# Patient Record
Sex: MALE | Race: WHITE | NOT HISPANIC OR LATINO | Employment: FULL TIME | URBAN - METROPOLITAN AREA
[De-identification: names, ages, dates, MRNs, and addresses within clinical notes are randomized per-mention and may not be internally consistent; named-entity substitution may affect disease eponyms.]

---

## 2018-10-24 ENCOUNTER — HOSPITAL ENCOUNTER (OUTPATIENT)
Dept: FAMILY MEDICINE CLINIC | Facility: CLINIC | Age: 48
Setting detail: SPECIMEN
Discharge: HOME | End: 2018-10-24
Attending: FAMILY MEDICINE | Admitting: FAMILY MEDICINE

## 2018-10-24 LAB
ALBUMIN SERPL-MCNC: 3.8 G/DL (ref 3.5–4.8)
ALBUMIN/GLOB SERPL: 1.1 {RATIO} (ref 1–1.7)
ALP SERPL-CCNC: 85 IU/L (ref 32–91)
ALT SERPL-CCNC: 35 IU/L (ref 17–63)
ANION GAP SERPL CALC-SCNC: 16.1 MMOL/L (ref 10–20)
AST SERPL-CCNC: 43 IU/L (ref 15–41)
BILIRUB SERPL-MCNC: 0.8 MG/DL (ref 0.3–1.2)
BUN SERPL-MCNC: 16 MG/DL (ref 8–20)
BUN/CREAT SERPL: 14.5 (ref 6.2–20.3)
CALCIUM SERPL-MCNC: 9.1 MG/DL (ref 8.9–10.3)
CHLORIDE SERPL-SCNC: 103 MMOL/L (ref 101–111)
CHOLEST SERPL-MCNC: 153 MG/DL
CHOLEST/HDLC SERPL: 4.6 {RATIO}
CONV CO2: 28 MMOL/L (ref 22–32)
CONV LDL CHOLESTEROL DIRECT: 116 MG/DL (ref 0–100)
CONV TOTAL PROTEIN: 7.4 G/DL (ref 6.1–7.9)
CREAT UR-MCNC: 1.1 MG/DL (ref 0.7–1.2)
GLOBULIN UR ELPH-MCNC: 3.6 G/DL (ref 2.5–3.8)
GLUCOSE SERPL-MCNC: 86 MG/DL (ref 65–99)
HDLC SERPL-MCNC: 34 MG/DL
LDLC/HDLC SERPL: 3.5 {RATIO}
LIPID INTERPRETATION: ABNORMAL
POTASSIUM SERPL-SCNC: 4.1 MMOL/L (ref 3.6–5.1)
SODIUM SERPL-SCNC: 143 MMOL/L (ref 136–144)
TRIGL SERPL-MCNC: 108 MG/DL
URATE SERPL-MCNC: 8.5 MG/DL (ref 4.8–8.7)
VLDLC SERPL CALC-MCNC: 3.9 MG/DL

## 2019-07-02 PROBLEM — I10 HYPERTENSION: Status: ACTIVE | Noted: 2018-10-24

## 2019-07-02 PROBLEM — M79.89 SWELLING OF LOWER EXTREMITY: Status: ACTIVE | Noted: 2019-05-07

## 2019-07-02 PROBLEM — E66.9 OBESITY: Status: ACTIVE | Noted: 2018-10-24

## 2019-07-02 PROBLEM — M10.9 GOUT: Status: ACTIVE | Noted: 2018-10-24

## 2019-07-02 RX ORDER — NAPROXEN SODIUM 220 MG
1 TABLET ORAL DAILY
COMMUNITY
Start: 2018-10-24 | End: 2020-05-20

## 2019-07-02 RX ORDER — SPIRONOLACTONE AND HYDROCHLOROTHIAZIDE 25; 25 MG/1; MG/1
1 TABLET ORAL EVERY MORNING
COMMUNITY
Start: 2018-11-20 | End: 2019-07-30 | Stop reason: SDUPTHER

## 2019-07-02 RX ORDER — NEBIVOLOL 10 MG/1
1 TABLET ORAL DAILY
COMMUNITY
Start: 2019-05-07 | End: 2019-07-02 | Stop reason: ALTCHOICE

## 2019-07-02 RX ORDER — SPIRONOLACTONE AND HYDROCHLOROTHIAZIDE 25; 25 MG/1; MG/1
TABLET ORAL
Qty: 30 TABLET | Refills: 0 | Status: SHIPPED | OUTPATIENT
Start: 2019-07-02 | End: 2019-07-30 | Stop reason: SDUPTHER

## 2019-07-02 RX ORDER — CARVEDILOL 6.25 MG/1
6.25 TABLET ORAL 2 TIMES DAILY WITH MEALS
COMMUNITY
End: 2019-08-08 | Stop reason: SDUPTHER

## 2019-07-02 RX ORDER — LOSARTAN POTASSIUM 100 MG/1
1 TABLET ORAL DAILY
COMMUNITY
Start: 2019-06-11 | End: 2019-07-09 | Stop reason: SDUPTHER

## 2019-07-02 NOTE — TELEPHONE ENCOUNTER
Bystolic was changed to Bystolic 6.25mg BID 60/0 and was called in to the [facility] on [facility]. Change was approved by  and the patient was notified.

## 2019-07-10 RX ORDER — LOSARTAN POTASSIUM 100 MG/1
TABLET ORAL
Qty: 90 TABLET | Refills: 0 | Status: SHIPPED | OUTPATIENT
Start: 2019-07-10 | End: 2019-10-05 | Stop reason: SDUPTHER

## 2019-07-30 RX ORDER — SPIRONOLACTONE AND HYDROCHLOROTHIAZIDE 25; 25 MG/1; MG/1
TABLET ORAL
Qty: 30 TABLET | Refills: 0 | Status: SHIPPED | OUTPATIENT
Start: 2019-07-30 | End: 2019-09-02 | Stop reason: SDUPTHER

## 2019-07-30 NOTE — TELEPHONE ENCOUNTER
Last visit:  6/11/19  Next visit: none  Last labs: 10/24/18    Rx requested:Spironalactone    Ph[facility]acy: [facility]

## 2019-08-06 RX ORDER — CARVEDILOL 6.25 MG/1
TABLET ORAL
Qty: 180 TABLET | Refills: 0 | Status: CANCELLED | OUTPATIENT
Start: 2019-08-06 | End: 2019-11-04

## 2019-08-07 ENCOUNTER — TELEPHONE (OUTPATIENT)
Dept: FAMILY MEDICINE CLINIC | Facility: CLINIC | Age: 49
End: 2019-08-07

## 2019-08-07 NOTE — TELEPHONE ENCOUNTER
Last visit:  6/11/19  Next visit: none  Last labs: 10/24/18    Rx requested: Carvedilol #60  Pharmacy: [facility] on [address]     (Patients wife called and said he will be out of this med tomorrow.)

## 2019-08-08 RX ORDER — CARVEDILOL 6.25 MG/1
6.25 TABLET ORAL 2 TIMES DAILY WITH MEALS
Qty: 60 TABLET | Refills: 0 | Status: SHIPPED | OUTPATIENT
Start: 2019-08-08 | End: 2019-10-15 | Stop reason: SDUPTHER

## 2019-08-08 NOTE — TELEPHONE ENCOUNTER
I called and spoke with his wife Berta and she said it has been running around 120/80 everyday. I went ahead and called in the #180 of the carvedilol to [facility] [address] since he was out today. Patient made and appt in October to see you and said he just wants to do labs that day and didn't want to have them done prior.

## 2019-08-08 NOTE — TELEPHONE ENCOUNTER
See  Other note. I already called in a 90 day supply since his BP has been running around 120/80s.

## 2019-09-03 RX ORDER — SPIRONOLACTONE AND HYDROCHLOROTHIAZIDE 25; 25 MG/1; MG/1
1 TABLET ORAL EVERY MORNING
Qty: 90 TABLET | Refills: 0 | Status: SHIPPED | OUTPATIENT
Start: 2019-09-03 | End: 2019-10-15 | Stop reason: SDUPTHER

## 2019-09-03 RX ORDER — SPIRONOLACTONE AND HYDROCHLOROTHIAZIDE 25; 25 MG/1; MG/1
TABLET ORAL
Qty: 30 TABLET | Refills: 1 | Status: SHIPPED | OUTPATIENT
Start: 2019-09-03 | End: 2019-09-03 | Stop reason: SDUPTHER

## 2019-09-03 RX ORDER — SPIRONOLACTONE AND HYDROCHLOROTHIAZIDE 25; 25 MG/1; MG/1
1 TABLET ORAL EVERY MORNING
Qty: 90 TABLET | Refills: 1 | Status: CANCELLED | OUTPATIENT
Start: 2019-09-03

## 2019-09-03 NOTE — TELEPHONE ENCOUNTER
Last visit:  6/11/19  Next visit: 10/15/19  Last labs: 10/24/18    Rx requested: Spironolactone   [facility]

## 2019-10-08 RX ORDER — LOSARTAN POTASSIUM 100 MG/1
TABLET ORAL
Qty: 30 TABLET | Refills: 0 | Status: SHIPPED | OUTPATIENT
Start: 2019-10-08 | End: 2019-10-15 | Stop reason: SDUPTHER

## 2019-10-08 NOTE — TELEPHONE ENCOUNTER
Last visit:  6/11/19  Next visit: 10/15/19  Last labs: 10/24/18    Rx requested: Losartan  [facility]     (patient is out of the med)

## 2019-10-08 NOTE — TELEPHONE ENCOUNTER
Last visit:  6/11/19  Next visit: 10/15/19  Last labs: 10/24/18    Rx requested: Losartan   [facility]: [facility] [place]

## 2019-10-09 RX ORDER — LOSARTAN POTASSIUM 100 MG/1
TABLET ORAL
Qty: 90 TABLET | Refills: 0 | OUTPATIENT
Start: 2019-10-09

## 2019-10-15 ENCOUNTER — OFFICE VISIT (OUTPATIENT)
Dept: FAMILY MEDICINE CLINIC | Facility: CLINIC | Age: 49
End: 2019-10-15

## 2019-10-15 VITALS
BODY MASS INDEX: 40.43 KG/M2 | TEMPERATURE: 98.2 F | RESPIRATION RATE: 16 BRPM | OXYGEN SATURATION: 100 % | HEART RATE: 82 BPM | SYSTOLIC BLOOD PRESSURE: 145 MMHG | DIASTOLIC BLOOD PRESSURE: 100 MMHG | HEIGHT: 74 IN | WEIGHT: 315 LBS

## 2019-10-15 DIAGNOSIS — I10 ESSENTIAL HYPERTENSION: Primary | ICD-10-CM

## 2019-10-15 DIAGNOSIS — E66.01 CLASS 3 SEVERE OBESITY DUE TO EXCESS CALORIES WITH SERIOUS COMORBIDITY AND BODY MASS INDEX (BMI) OF 40.0 TO 44.9 IN ADULT (HCC): ICD-10-CM

## 2019-10-15 PROCEDURE — 99213 OFFICE O/P EST LOW 20 MIN: CPT | Performed by: FAMILY MEDICINE

## 2019-10-15 RX ORDER — LOSARTAN POTASSIUM 100 MG/1
100 TABLET ORAL DAILY
Qty: 90 TABLET | Refills: 0 | Status: SHIPPED | OUTPATIENT
Start: 2019-10-15 | End: 2020-01-30

## 2019-10-15 RX ORDER — SPIRONOLACTONE AND HYDROCHLOROTHIAZIDE 25; 25 MG/1; MG/1
1 TABLET ORAL EVERY MORNING
Qty: 90 TABLET | Refills: 0 | Status: SHIPPED | OUTPATIENT
Start: 2019-10-15 | End: 2019-11-16

## 2019-10-15 RX ORDER — CARVEDILOL 6.25 MG/1
6.25 TABLET ORAL 2 TIMES DAILY WITH MEALS
Qty: 180 TABLET | Refills: 0 | Status: SHIPPED | OUTPATIENT
Start: 2019-10-15 | End: 2019-11-16

## 2019-11-12 ENCOUNTER — CLINICAL SUPPORT (OUTPATIENT)
Dept: FAMILY MEDICINE CLINIC | Facility: CLINIC | Age: 49
End: 2019-11-12

## 2019-11-12 DIAGNOSIS — I10 ESSENTIAL HYPERTENSION: ICD-10-CM

## 2019-11-12 PROCEDURE — 80053 COMPREHEN METABOLIC PANEL: CPT | Performed by: FAMILY MEDICINE

## 2019-11-12 PROCEDURE — 36415 COLL VENOUS BLD VENIPUNCTURE: CPT | Performed by: FAMILY MEDICINE

## 2019-11-12 PROCEDURE — 80061 LIPID PANEL: CPT | Performed by: FAMILY MEDICINE

## 2019-11-13 LAB
ALBUMIN SERPL-MCNC: 4 G/DL (ref 3.5–5.2)
ALBUMIN/GLOB SERPL: 1 G/DL
ALP SERPL-CCNC: 89 U/L (ref 39–117)
ALT SERPL W P-5'-P-CCNC: 22 U/L (ref 1–41)
ANION GAP SERPL CALCULATED.3IONS-SCNC: 10.4 MMOL/L (ref 5–15)
AST SERPL-CCNC: 17 U/L (ref 1–40)
BILIRUB SERPL-MCNC: 0.4 MG/DL (ref 0.2–1.2)
BUN BLD-MCNC: 18 MG/DL (ref 6–20)
BUN/CREAT SERPL: 12.3 (ref 7–25)
CALCIUM SPEC-SCNC: 9.3 MG/DL (ref 8.6–10.5)
CHLORIDE SERPL-SCNC: 103 MMOL/L (ref 98–107)
CHOLEST SERPL-MCNC: 162 MG/DL (ref 0–200)
CO2 SERPL-SCNC: 30.6 MMOL/L (ref 22–29)
CREAT BLD-MCNC: 1.46 MG/DL (ref 0.76–1.27)
GFR SERPL CREATININE-BSD FRML MDRD: 51 ML/MIN/1.73
GLOBULIN UR ELPH-MCNC: 3.9 GM/DL
GLUCOSE BLD-MCNC: 109 MG/DL (ref 65–99)
HDLC SERPL-MCNC: 35 MG/DL (ref 40–60)
LDLC SERPL CALC-MCNC: 93 MG/DL (ref 0–100)
LDLC/HDLC SERPL: 2.66 {RATIO}
POTASSIUM BLD-SCNC: 4.6 MMOL/L (ref 3.5–5.2)
PROT SERPL-MCNC: 7.9 G/DL (ref 6–8.5)
SODIUM BLD-SCNC: 144 MMOL/L (ref 136–145)
TRIGL SERPL-MCNC: 170 MG/DL (ref 0–150)
VLDLC SERPL-MCNC: 34 MG/DL (ref 5–40)

## 2019-11-16 RX ORDER — CARVEDILOL 12.5 MG/1
12.5 TABLET ORAL 2 TIMES DAILY WITH MEALS
Status: SHIPPED
Start: 2019-11-16 | End: 2019-11-25 | Stop reason: SDUPTHER

## 2019-11-25 RX ORDER — CARVEDILOL 12.5 MG/1
12.5 TABLET ORAL 2 TIMES DAILY WITH MEALS
Qty: 60 TABLET | Refills: 0 | Status: SHIPPED | OUTPATIENT
Start: 2019-11-25 | End: 2019-12-23

## 2019-11-25 RX ORDER — CARVEDILOL 12.5 MG/1
TABLET ORAL
Qty: 180 TABLET | Refills: 0 | OUTPATIENT
Start: 2019-11-25

## 2019-11-25 RX ORDER — COLCHICINE 0.6 MG/1
0.6 TABLET ORAL
COMMUNITY
Start: 2018-10-24 | End: 2019-11-25

## 2019-11-25 RX ORDER — COLCHICINE 0.6 MG/1
TABLET ORAL
Qty: 20 TABLET | Refills: 0 | Status: SHIPPED | OUTPATIENT
Start: 2019-11-25 | End: 2020-02-14 | Stop reason: SDUPTHER

## 2019-11-25 NOTE — TELEPHONE ENCOUNTER
Pts wife states the new increased Coreg was not received at [facility].  Also feels he has gout and they are going to [place] this week and would like to get the Colcrys medication for this.  Last seen 10/15/19

## 2019-12-23 RX ORDER — CARVEDILOL 12.5 MG/1
TABLET ORAL
Qty: 60 TABLET | Refills: 0 | Status: SHIPPED | OUTPATIENT
Start: 2019-12-23 | End: 2019-12-26

## 2019-12-26 ENCOUNTER — TELEPHONE (OUTPATIENT)
Dept: FAMILY MEDICINE CLINIC | Facility: CLINIC | Age: 49
End: 2019-12-26

## 2019-12-26 RX ORDER — HYDRALAZINE HYDROCHLORIDE 10 MG/1
10 TABLET, FILM COATED ORAL 2 TIMES DAILY
Qty: 60 TABLET | Refills: 1 | Status: SHIPPED | OUTPATIENT
Start: 2019-12-26 | End: 2020-01-23 | Stop reason: SDUPTHER

## 2019-12-26 RX ORDER — CARVEDILOL 12.5 MG/1
12.5 TABLET ORAL 2 TIMES DAILY WITH MEALS
Qty: 60 TABLET | Refills: 0 | COMMUNITY
Start: 2019-12-26 | End: 2020-01-22

## 2019-12-26 NOTE — TELEPHONE ENCOUNTER
Let pt know since had issues w/ norvasc and kidneys not great, will have to go w/ bid hydralazine and see if that works

## 2019-12-27 RX ORDER — HYDRALAZINE HYDROCHLORIDE 10 MG/1
TABLET, FILM COATED ORAL
Qty: 180 TABLET | OUTPATIENT
Start: 2019-12-27

## 2020-01-22 RX ORDER — CARVEDILOL 12.5 MG/1
TABLET ORAL
Qty: 180 TABLET | Refills: 0 | Status: SHIPPED | OUTPATIENT
Start: 2020-01-22 | End: 2020-03-26 | Stop reason: SDUPTHER

## 2020-01-23 RX ORDER — HYDRALAZINE HYDROCHLORIDE 10 MG/1
10 TABLET, FILM COATED ORAL 2 TIMES DAILY
Qty: 60 TABLET | Refills: 1 | Status: SHIPPED | OUTPATIENT
Start: 2020-01-23 | End: 2020-02-21

## 2020-01-23 RX ORDER — HYDRALAZINE HYDROCHLORIDE 10 MG/1
TABLET, FILM COATED ORAL
Qty: 180 TABLET | OUTPATIENT
Start: 2020-01-23

## 2020-01-24 RX ORDER — HYDRALAZINE HYDROCHLORIDE 10 MG/1
10 TABLET, FILM COATED ORAL 2 TIMES DAILY
Qty: 60 TABLET | Refills: 1 | OUTPATIENT
Start: 2020-01-24

## 2020-01-24 RX ORDER — HYDRALAZINE HYDROCHLORIDE 10 MG/1
TABLET, FILM COATED ORAL
Qty: 180 TABLET | OUTPATIENT
Start: 2020-01-24

## 2020-01-24 NOTE — TELEPHONE ENCOUNTER
Last visit:  10/15/19  Next visit: none   Last labs: 11/12/19    Rx requested: Hydralazine  [facility]

## 2020-01-30 RX ORDER — LOSARTAN POTASSIUM 100 MG/1
TABLET ORAL
Qty: 90 TABLET | Refills: 0 | Status: SHIPPED | OUTPATIENT
Start: 2020-01-30 | End: 2020-03-26 | Stop reason: SDUPTHER

## 2020-02-14 RX ORDER — COLCHICINE 0.6 MG/1
TABLET ORAL
Qty: 20 TABLET | Refills: 0 | OUTPATIENT
Start: 2020-02-14

## 2020-02-14 RX ORDER — COLCHICINE 0.6 MG/1
TABLET ORAL
Qty: 20 TABLET | Refills: 0 | Status: SHIPPED | OUTPATIENT
Start: 2020-02-14 | End: 2020-07-14

## 2020-02-21 RX ORDER — HYDRALAZINE HYDROCHLORIDE 10 MG/1
TABLET, FILM COATED ORAL
Qty: 60 TABLET | Refills: 0 | Status: SHIPPED | OUTPATIENT
Start: 2020-02-21 | End: 2020-03-26

## 2020-02-21 NOTE — TELEPHONE ENCOUNTER
Last visit:  10/15/19  Next visit: none  Last labs: 11/12/19     Rx requested: hydrALAZINE 10 MG tablet  [facility]

## 2020-03-23 NOTE — TELEPHONE ENCOUNTER
Last visit:  10/15/19  Next visit: none   Last labs: 11/12/19    Rx requested: Hydralazine   [facility]

## 2020-03-25 RX ORDER — HYDRALAZINE HYDROCHLORIDE 10 MG/1
TABLET, FILM COATED ORAL
Qty: 60 TABLET | Refills: 0 | OUTPATIENT
Start: 2020-03-25

## 2020-03-26 RX ORDER — CARVEDILOL 12.5 MG/1
12.5 TABLET ORAL 2 TIMES DAILY WITH MEALS
Qty: 180 TABLET | Refills: 0 | Status: SHIPPED | OUTPATIENT
Start: 2020-03-26 | End: 2020-05-20

## 2020-03-26 RX ORDER — LOSARTAN POTASSIUM 100 MG/1
100 TABLET ORAL DAILY
Qty: 90 TABLET | Refills: 0 | Status: SHIPPED | OUTPATIENT
Start: 2020-03-26 | End: 2020-05-20

## 2020-03-26 RX ORDER — HYDRALAZINE HYDROCHLORIDE 25 MG/1
25 TABLET, FILM COATED ORAL 2 TIMES DAILY
Qty: 60 TABLET | Refills: 2 | Status: SHIPPED | OUTPATIENT
Start: 2020-03-26 | End: 2020-05-20

## 2020-03-26 RX ORDER — HYDRALAZINE HYDROCHLORIDE 25 MG/1
TABLET, FILM COATED ORAL
Qty: 180 TABLET | OUTPATIENT
Start: 2020-03-26

## 2020-05-20 ENCOUNTER — TELEPHONE (OUTPATIENT)
Dept: FAMILY MEDICINE CLINIC | Facility: CLINIC | Age: 50
End: 2020-05-20

## 2020-05-20 RX ORDER — NEBIVOLOL 10 MG/1
10 TABLET ORAL DAILY
Qty: 30 TABLET | Refills: 0 | Status: SHIPPED | OUTPATIENT
Start: 2020-05-20 | End: 2020-06-19

## 2020-05-22 ENCOUNTER — TELEPHONE (OUTPATIENT)
Dept: FAMILY MEDICINE CLINIC | Facility: CLINIC | Age: 50
End: 2020-05-22

## 2020-05-22 NOTE — TELEPHONE ENCOUNTER
I called the patients pharmacy and it is only going to cost $37.71. I sent the patient another my chart message to let him know that the pharmacy will be getting the medication ready for him.

## 2020-05-22 NOTE — TELEPHONE ENCOUNTER
----- Message from Azul Beckett DO sent at 5/21/2020  5:00 PM EDT -----  Regarding: FW: Prescription Question  Contact: [phone]  Did you get this info for a PA???  Ill put some samples up front     ----- Message -----  From: Melanie Tapia RT  Sent: 5/21/2020  10:04 AM EDT  To: Azul Beckett DO  Subject: FW: Prescription Question                            ----- Message -----  From: Boyd Reese Jr.  Sent: 5/21/2020   9:36 AM EDT  To: k [facility]  Subject: RE: Prescription Question                        [facility] faxed over a form for you to fill out and [employer] will need to be contacted to get prior authorization and they may need to know why I need to take Bystolic, even though [employer] will most likely not pay on it, to get the Bystolic savings card price, this process needs to happen and it needs to run through insurance first.  I have a 1 week supply of sample you gave me from when I took it before, hopefully we can get the refill going soon.  I am willing to pay full price if I have to, but if I can get it for $35 that would be a win.  Thank you for your help.  ----- Message -----  From: Azul Beckett DO  Sent: 5/20/2020  7:08 PM EDT  To: Boyd Reese Jr.  Subject: RE: Prescription Question  If your BP was controlled on JUST Bystolic, stop all others and we will see if good enough by itself    ----- Message -----     From: Boyd Reese Jr.     Sent: 5/20/2020  3:15 PM EDT       To: Azul Beckett DO  Subject: RE: Prescription Question    Absolutely, will send readings.  And, for the record, stop taking the others?  Thanks.  ----- Message -----  From: Azul Beckett DO  Sent: 5/20/2020  1:27 PM EDT  To: Boyd Reese Jr.  Subject: RE: Prescription Question  I sent out bystolic 10mg------------can check for online coupon...... I will need BP readings before the end of the month    ----- Message -----     From: Boyd Reese Jr.     Sent: 5/20/2020  1:02 PM EDT       To: Azul  Sujit, DO  Subject: Prescription Question    My bp has still been running high even after upping the hydralazine 25 MG tablet.  The one I was on before, instead of that, the Spironolactone 25mg w/hctz 25mg kept my bp down.  Is it not possible to go back to that one?  Or, even better, you had first put me on just 1 pill, Bystolic 10mg.  That one worked perfectly, the only reason we stopped was cost.  I am willing to go back on that and pay the cost, about $150 per month if that means only having to take 1 pill a day, that would be awesome.  Please advise.  Mitch Reese

## 2020-05-22 NOTE — TELEPHONE ENCOUNTER
I sent to patient a my chart message. Letting him know that the PA was approved today and approval was faxed to the pharmacy and that he can  more sample on Tuesday as we are closed to patients today-Monday.

## 2020-06-19 RX ORDER — CARVEDILOL 12.5 MG/1
TABLET ORAL
Qty: 180 TABLET | Refills: 0 | OUTPATIENT
Start: 2020-06-19

## 2020-06-19 RX ORDER — NEBIVOLOL HYDROCHLORIDE 10 MG/1
TABLET ORAL
Qty: 90 TABLET | Refills: 0 | Status: SHIPPED | OUTPATIENT
Start: 2020-06-19 | End: 2020-09-18

## 2020-06-19 RX ORDER — LOSARTAN POTASSIUM 100 MG/1
100 TABLET ORAL DAILY
Qty: 90 TABLET | Refills: 0 | OUTPATIENT
Start: 2020-06-19

## 2020-07-14 RX ORDER — COLCHICINE 0.6 MG/1
TABLET ORAL
Qty: 20 TABLET | Refills: 0 | Status: SHIPPED | OUTPATIENT
Start: 2020-07-14 | End: 2020-11-20 | Stop reason: SDUPTHER

## 2020-07-14 NOTE — TELEPHONE ENCOUNTER
Last visit:  10/15/19  Next visit: none  Last labs: 11/12/19    Rx requested: colchicine,  [facility]

## 2020-07-17 ENCOUNTER — TELEPHONE (OUTPATIENT)
Dept: FAMILY MEDICINE CLINIC | Facility: CLINIC | Age: 50
End: 2020-07-17

## 2020-09-18 ENCOUNTER — TELEPHONE (OUTPATIENT)
Dept: FAMILY MEDICINE CLINIC | Facility: CLINIC | Age: 50
End: 2020-09-18

## 2020-09-18 DIAGNOSIS — I10 ESSENTIAL HYPERTENSION: Primary | ICD-10-CM

## 2020-09-18 DIAGNOSIS — Z12.5 SCREENING FOR PROSTATE CANCER: ICD-10-CM

## 2020-09-18 DIAGNOSIS — Z13.220 SCREENING FOR LIPID DISORDERS: ICD-10-CM

## 2020-09-18 RX ORDER — NEBIVOLOL HYDROCHLORIDE 10 MG/1
TABLET ORAL
Qty: 90 TABLET | Refills: 0 | Status: SHIPPED | OUTPATIENT
Start: 2020-09-18 | End: 2020-12-18 | Stop reason: SDUPTHER

## 2020-09-21 NOTE — TELEPHONE ENCOUNTER
I did a PA in May and it was approved. I will call the pharmacy.     Status: Approved, Coverage Starts on: 5/22/2020 12:00:00 AM, Coverage Ends on: 12/31/2020 12:00:00 AM.

## 2020-09-23 NOTE — TELEPHONE ENCOUNTER
I resubmitted the PA and it said one was already on file. (since it was already approved in May)  I called the pharmacy and they said the patient picked up the 90 day supply on 9/21/20.

## 2020-11-20 RX ORDER — COLCHICINE 0.6 MG/1
TABLET ORAL
Qty: 20 TABLET | Refills: 0 | Status: SHIPPED | OUTPATIENT
Start: 2020-11-20 | End: 2021-01-20 | Stop reason: SDUPTHER

## 2020-11-20 NOTE — TELEPHONE ENCOUNTER
Last visit:  10/15/19  Next visit: none  Last labs: 11/12/19    Rx requested: Colchicine   [facility]

## 2020-12-17 RX ORDER — NEBIVOLOL HYDROCHLORIDE 10 MG/1
TABLET ORAL
Qty: 90 TABLET | Refills: 0 | OUTPATIENT
Start: 2020-12-17

## 2020-12-17 NOTE — TELEPHONE ENCOUNTER
Last visit:  10/15/19  Next visit: none  Last labs: 11/12/19    Rx requested: [facility]   [facility]acy: [facility]

## 2020-12-18 RX ORDER — NEBIVOLOL 10 MG/1
10 TABLET ORAL DAILY
Qty: 90 TABLET | Refills: 0 | Status: CANCELLED | OUTPATIENT
Start: 2020-12-18

## 2020-12-18 RX ORDER — NEBIVOLOL 10 MG/1
10 TABLET ORAL DAILY
Qty: 90 TABLET | Refills: 0 | Status: SHIPPED | OUTPATIENT
Start: 2020-12-18 | End: 2020-12-24 | Stop reason: SDUPTHER

## 2020-12-22 RX ORDER — NEBIVOLOL HYDROCHLORIDE 10 MG/1
TABLET ORAL
Qty: 90 TABLET | Refills: 0 | OUTPATIENT
Start: 2020-12-22

## 2020-12-22 NOTE — TELEPHONE ENCOUNTER
Bystolic - WG (needs resent p/ pharm)     Last visit:  10/15/19  Next visit: none  Last labs: 11/12/19

## 2020-12-24 RX ORDER — NEBIVOLOL 10 MG/1
10 TABLET ORAL DAILY
Qty: 21 TABLET | Refills: 0 | Status: SHIPPED | OUTPATIENT
Start: 2020-12-24 | End: 2021-03-17 | Stop reason: SDUPTHER

## 2021-01-20 RX ORDER — COLCHICINE 0.6 MG/1
TABLET ORAL
Qty: 20 TABLET | Refills: 0 | OUTPATIENT
Start: 2021-01-20

## 2021-01-20 RX ORDER — COLCHICINE 0.6 MG/1
TABLET ORAL
Qty: 20 TABLET | Refills: 0 | Status: SHIPPED | OUTPATIENT
Start: 2021-01-20 | End: 2021-02-01 | Stop reason: SDUPTHER

## 2021-02-01 RX ORDER — COLCHICINE 0.6 MG/1
TABLET ORAL
Qty: 20 TABLET | Refills: 0 | Status: SHIPPED | OUTPATIENT
Start: 2021-02-01 | End: 2021-03-17

## 2021-02-01 NOTE — TELEPHONE ENCOUNTER
Colchicine    [facility]    Seen 10/2019  sched 3/11/21  Fasting 11/2019 - labs ordered for 11/2020 but not done

## 2021-03-16 NOTE — TELEPHONE ENCOUNTER
Bystolic - [facility]    Seen 10/2019  sched 3/11/21  Fasting 11/2019 - labs ordered for 11/2020 but not done

## 2021-03-17 ENCOUNTER — OFFICE VISIT (OUTPATIENT)
Dept: FAMILY MEDICINE CLINIC | Facility: CLINIC | Age: 51
End: 2021-03-17

## 2021-03-17 VITALS
OXYGEN SATURATION: 98 % | DIASTOLIC BLOOD PRESSURE: 140 MMHG | RESPIRATION RATE: 18 BRPM | HEIGHT: 74 IN | HEART RATE: 80 BPM | TEMPERATURE: 97.8 F | SYSTOLIC BLOOD PRESSURE: 231 MMHG | WEIGHT: 315 LBS | BODY MASS INDEX: 40.43 KG/M2

## 2021-03-17 DIAGNOSIS — Z12.11 COLON CANCER SCREENING: ICD-10-CM

## 2021-03-17 DIAGNOSIS — I10 ESSENTIAL HYPERTENSION: Primary | ICD-10-CM

## 2021-03-17 PROCEDURE — 99213 OFFICE O/P EST LOW 20 MIN: CPT | Performed by: FAMILY MEDICINE

## 2021-03-17 RX ORDER — NEBIVOLOL 20 MG/1
20 TABLET ORAL DAILY
Qty: 90 TABLET | Refills: 0 | Status: SHIPPED | OUTPATIENT
Start: 2021-03-17 | End: 2021-06-11 | Stop reason: SDUPTHER

## 2021-03-17 RX ORDER — NEBIVOLOL HYDROCHLORIDE 10 MG/1
TABLET ORAL
Qty: 90 TABLET | OUTPATIENT
Start: 2021-03-17

## 2021-03-17 NOTE — PROGRESS NOTES
Subjective   Boyd Reese Jr. is a 50 y.o. male.     Chief Complaint   Patient presents with   • Hypertension     Patient states that he took the Bystolic at 7:30Am this morning but his anxiety is really bad this morning because of work.          Current Outpatient Medications:   •  nebivolol (Bystolic) 20 MG tablet, Take 1 tablet by mouth Daily., Disp: 90 tablet, Rfl: 0    Past Medical History:   Diagnosis Date   • Gout 10/24/2018   • Hypertension 10/24/2018   • Obesity 10/24/2018   • PSA        Past Surgical History:   Procedure Laterality Date   • COLONOSCOPY         Family History   Problem Relation Age of Onset   • Hypertension Father    • Seizures Sister        Social History     Socioeconomic History   • Marital status:      Spouse name: Not on file   • Number of children: Not on file   • Years of education: Not on file   • Highest education level: Not on file   Tobacco Use   • Smoking status: Never Smoker   • Smokeless tobacco: Never Used   Substance and Sexual Activity   • Alcohol use: Yes     Comment: occassionally   • Drug use: No   • Sexual activity: Defer       49y/o C male here for f/u on HTN    Pt states he has been taking his BP med and it is usu lower than here but still about 150's= SBP    Pt admits he has put on a lot of wt (10/2019 = 336#) and today wt =373#; He states he and his wife have fallen off the wagon w/ healthy eating and exercise    Hypertension  This is a chronic problem. The current episode started more than 1 year ago. The problem has been gradually worsening since onset. The problem is resistant. Pertinent negatives include no chest pain, palpitations or shortness of breath.        The following portions of the patient's history were reviewed and updated as appropriate: allergies, current medications, past family history, past medical history, past social history, past surgical history and problem list.    Review of Systems   Constitutional: Positive for activity change,  appetite change and unexpected weight gain. Negative for fatigue.   Respiratory: Negative for cough, chest tightness and shortness of breath.    Cardiovascular: Negative for chest pain, palpitations and leg swelling.   Genitourinary: Negative for frequency, urgency and urinary incontinence.   Musculoskeletal: Negative for arthralgias and myalgias.   Neurological: Negative for dizziness, facial asymmetry, speech difficulty, weakness, light-headedness, headache, memory problem and confusion.       Vitals:    03/17/21 0956   BP: (!) 231/140   Pulse: 80   Resp: 18   Temp: 97.8 °F (36.6 °C)   SpO2: 98%       Objective   Physical Exam  Vitals and nursing note reviewed.   Constitutional:       General: He is not in acute distress.     Appearance: Normal appearance. He is well-developed. He is obese. He is not ill-appearing or toxic-appearing.   HENT:      Head: Normocephalic and atraumatic.   Cardiovascular:      Rate and Rhythm: Normal rate and regular rhythm.      Heart sounds: Normal heart sounds. No murmur heard.     Pulmonary:      Effort: Pulmonary effort is normal.      Breath sounds: Normal breath sounds.   Musculoskeletal:      Cervical back: Normal range of motion and neck supple.   Skin:     General: Skin is warm and dry.      Findings: No rash.   Neurological:      Mental Status: He is alert and oriented to person, place, and time.      Cranial Nerves: No cranial nerve deficit.   Psychiatric:         Mood and Affect: Mood normal.         Behavior: Behavior normal.         Thought Content: Thought content normal.         Judgment: Judgment normal.           Assessment/Plan   Diagnoses and all orders for this visit:    1. Essential hypertension (Primary)    2. Colon cancer screening  -     Cologuard - Stool, Per Rectum; Future    Other orders  -     nebivolol (Bystolic) 20 MG tablet; Take 1 tablet by mouth Daily.  Dispense: 90 tablet; Refill: 0    fasting labs soon  Check home BP cuff w/ lab draw

## 2021-03-31 ENCOUNTER — CLINICAL SUPPORT (OUTPATIENT)
Dept: FAMILY MEDICINE CLINIC | Facility: CLINIC | Age: 51
End: 2021-03-31

## 2021-03-31 ENCOUNTER — TELEPHONE (OUTPATIENT)
Dept: FAMILY MEDICINE CLINIC | Facility: CLINIC | Age: 51
End: 2021-03-31

## 2021-03-31 VITALS — DIASTOLIC BLOOD PRESSURE: 124 MMHG | HEART RATE: 72 BPM | SYSTOLIC BLOOD PRESSURE: 224 MMHG

## 2021-03-31 DIAGNOSIS — Z13.220 SCREENING FOR LIPID DISORDERS: ICD-10-CM

## 2021-03-31 DIAGNOSIS — I10 ESSENTIAL HYPERTENSION: ICD-10-CM

## 2021-03-31 DIAGNOSIS — Z12.5 SCREENING FOR PROSTATE CANCER: ICD-10-CM

## 2021-03-31 PROCEDURE — 80061 LIPID PANEL: CPT | Performed by: FAMILY MEDICINE

## 2021-03-31 PROCEDURE — G0103 PSA SCREENING: HCPCS | Performed by: FAMILY MEDICINE

## 2021-03-31 PROCEDURE — 36415 COLL VENOUS BLD VENIPUNCTURE: CPT | Performed by: FAMILY MEDICINE

## 2021-03-31 PROCEDURE — 80053 COMPREHEN METABOLIC PANEL: CPT | Performed by: FAMILY MEDICINE

## 2021-03-31 NOTE — PROGRESS NOTES
Patients wrist cuff on right wrist at 10:09AM was 219/147 pulse 71, Our manual reading on Left arm at 10:17AM was 224/124 pulse 72 . Patient was told to keep a log of his BP reading for 3 weeks. I gave the patinet a BP reading log.

## 2021-04-01 LAB
ALBUMIN SERPL-MCNC: 4.2 G/DL (ref 3.5–5.2)
ALBUMIN/GLOB SERPL: 1.3 G/DL
ALP SERPL-CCNC: 90 U/L (ref 39–117)
ALT SERPL W P-5'-P-CCNC: 19 U/L (ref 1–41)
ANION GAP SERPL CALCULATED.3IONS-SCNC: 8.2 MMOL/L (ref 5–15)
AST SERPL-CCNC: 22 U/L (ref 1–40)
BILIRUB SERPL-MCNC: 0.4 MG/DL (ref 0–1.2)
BUN SERPL-MCNC: 11 MG/DL (ref 6–20)
BUN/CREAT SERPL: 8.1 (ref 7–25)
CALCIUM SPEC-SCNC: 8.7 MG/DL (ref 8.6–10.5)
CHLORIDE SERPL-SCNC: 105 MMOL/L (ref 98–107)
CHOLEST SERPL-MCNC: 164 MG/DL (ref 0–200)
CO2 SERPL-SCNC: 28.8 MMOL/L (ref 22–29)
CREAT SERPL-MCNC: 1.35 MG/DL (ref 0.76–1.27)
GFR SERPL CREATININE-BSD FRML MDRD: 56 ML/MIN/1.73
GLOBULIN UR ELPH-MCNC: 3.2 GM/DL
GLUCOSE SERPL-MCNC: 92 MG/DL (ref 65–99)
HDLC SERPL-MCNC: 32 MG/DL (ref 40–60)
LDLC SERPL CALC-MCNC: 113 MG/DL (ref 0–100)
LDLC/HDLC SERPL: 3.5 {RATIO}
POTASSIUM SERPL-SCNC: 4.8 MMOL/L (ref 3.5–5.2)
PROT SERPL-MCNC: 7.4 G/DL (ref 6–8.5)
PSA SERPL-MCNC: 1.29 NG/ML (ref 0–4)
SODIUM SERPL-SCNC: 142 MMOL/L (ref 136–145)
TRIGL SERPL-MCNC: 100 MG/DL (ref 0–150)
VLDLC SERPL-MCNC: 19 MG/DL (ref 5–40)

## 2021-04-02 ENCOUNTER — TELEPHONE (OUTPATIENT)
Dept: FAMILY MEDICINE CLINIC | Facility: CLINIC | Age: 51
End: 2021-04-02

## 2021-04-02 NOTE — TELEPHONE ENCOUNTER
Hub to read.  My chart message was sent to the patient.   PSA ok   CHOLESTEROL ok   CMP---kidneys some better

## 2021-04-02 NOTE — TELEPHONE ENCOUNTER
----- Message from Azul Beckett DO sent at 4/1/2021 10:33 PM EDT -----  PSA ok  CHOL ok  CMP---kidneys some better

## 2021-04-02 NOTE — TELEPHONE ENCOUNTER
Hub to read.     PA for Colchicine was approved.  PA approval was faxed to [facility] [place].   PA Case: [id number], Status: Approved, Coverage Starts on: 4/2/2021 12:15:58 PM, Coverage Ends on: 4/2/2021 12:15:58 PM. Questions? Contact [phone].

## 2021-04-15 ENCOUNTER — PATIENT MESSAGE (OUTPATIENT)
Dept: FAMILY MEDICINE CLINIC | Facility: CLINIC | Age: 51
End: 2021-04-15

## 2021-04-17 RX ORDER — AMLODIPINE BESYLATE 2.5 MG/1
2.5 TABLET ORAL DAILY
Qty: 30 TABLET | Refills: 0 | Status: SHIPPED | OUTPATIENT
Start: 2021-04-17 | End: 2021-04-19

## 2021-04-19 RX ORDER — DILTIAZEM HYDROCHLORIDE 120 MG/1
CAPSULE, COATED, EXTENDED RELEASE ORAL
Qty: 90 CAPSULE | OUTPATIENT
Start: 2021-04-19

## 2021-04-19 RX ORDER — DILTIAZEM HYDROCHLORIDE 120 MG/1
120 CAPSULE, COATED, EXTENDED RELEASE ORAL DAILY
Qty: 30 CAPSULE | Refills: 1 | Status: SHIPPED | OUTPATIENT
Start: 2021-04-19 | End: 2021-06-10 | Stop reason: SDUPTHER

## 2021-04-20 RX ORDER — DILTIAZEM HYDROCHLORIDE 120 MG/1
120 CAPSULE, COATED, EXTENDED RELEASE ORAL DAILY
Qty: 90 CAPSULE | OUTPATIENT
Start: 2021-04-20

## 2021-05-05 RX ORDER — ALLOPURINOL 100 MG/1
100 TABLET ORAL DAILY
Qty: 90 TABLET | Refills: 1 | Status: SHIPPED | OUTPATIENT
Start: 2021-05-05 | End: 2021-12-09 | Stop reason: SDUPTHER

## 2021-06-10 RX ORDER — DILTIAZEM HYDROCHLORIDE 120 MG/1
120 CAPSULE, COATED, EXTENDED RELEASE ORAL DAILY
Qty: 30 CAPSULE | Refills: 1 | Status: SHIPPED | OUTPATIENT
Start: 2021-06-10 | End: 2021-06-11 | Stop reason: SDUPTHER

## 2021-06-11 RX ORDER — NEBIVOLOL 20 MG/1
20 TABLET ORAL DAILY
Qty: 90 TABLET | Refills: 0 | Status: SHIPPED | OUTPATIENT
Start: 2021-06-11 | End: 2021-09-08

## 2021-06-11 RX ORDER — DILTIAZEM HYDROCHLORIDE 180 MG/1
180 CAPSULE, COATED, EXTENDED RELEASE ORAL DAILY
Qty: 90 CAPSULE | Refills: 0 | Status: SHIPPED | OUTPATIENT
Start: 2021-06-11 | End: 2021-09-14 | Stop reason: SDUPTHER

## 2021-07-20 RX ORDER — COLCHICINE 0.6 MG/1
TABLET ORAL
Qty: 20 TABLET | Refills: 0 | Status: SHIPPED | OUTPATIENT
Start: 2021-07-20 | End: 2021-11-09

## 2021-09-07 NOTE — TELEPHONE ENCOUNTER
Rx Refill Note  Requested Prescriptions     Pending Prescriptions Disp Refills   • Bystolic 20 MG tablet [Pharmacy Med Name: BYSTOLIC 20MG TABLETS] 90 tablet 0     Sig: TAKE 1 TABLET BY MOUTH DAILY      Last office visit with prescribing clinician: 3/17/2021      Next office visit with prescribing clinician: Visit date not found     Lipid Panel (03/31/2021 10:00)         Melanie Tapia, RT  09/07/21, 09:58 EDT

## 2021-09-08 ENCOUNTER — CLINICAL SUPPORT (OUTPATIENT)
Dept: FAMILY MEDICINE CLINIC | Facility: CLINIC | Age: 51
End: 2021-09-08

## 2021-09-08 VITALS — DIASTOLIC BLOOD PRESSURE: 120 MMHG | SYSTOLIC BLOOD PRESSURE: 204 MMHG

## 2021-09-08 RX ORDER — NEBIVOLOL HYDROCHLORIDE 20 MG/1
TABLET ORAL
Qty: 90 TABLET | Refills: 0 | Status: SHIPPED | OUTPATIENT
Start: 2021-09-08 | End: 2021-12-09 | Stop reason: SDUPTHER

## 2021-09-08 NOTE — PROGRESS NOTES
Pt states he is getting lower BP readings w/ his home cuff but he hasnt brought it in to be checked for accuracy-----His BP's = 150's/70's

## 2021-09-08 NOTE — PROGRESS NOTES
Pt in office now for BP check. States he took both his BP meds today already and BP running at home is 150's/90's.    204/120 hr 66  196/127 hr 65  210/118 hr 67    Dr Beckett was consulted and pt informed to return next wk with his wrist cuff to check for accuracy.

## 2021-09-14 ENCOUNTER — CLINICAL SUPPORT (OUTPATIENT)
Dept: FAMILY MEDICINE CLINIC | Facility: CLINIC | Age: 51
End: 2021-09-14

## 2021-09-14 ENCOUNTER — PATIENT MESSAGE (OUTPATIENT)
Dept: FAMILY MEDICINE CLINIC | Facility: CLINIC | Age: 51
End: 2021-09-14

## 2021-09-14 VITALS — SYSTOLIC BLOOD PRESSURE: 196 MMHG | HEART RATE: 72 BPM | DIASTOLIC BLOOD PRESSURE: 98 MMHG

## 2021-09-14 RX ORDER — DILTIAZEM HYDROCHLORIDE 240 MG/1
240 CAPSULE, COATED, EXTENDED RELEASE ORAL DAILY
Qty: 90 CAPSULE | Refills: 0 | Status: SHIPPED | OUTPATIENT
Start: 2021-09-14 | End: 2021-12-09 | Stop reason: SDUPTHER

## 2021-09-14 NOTE — PROGRESS NOTES
Answers for HPI/ROS submitted by the patient on 9/10/2021  What is the primary reason for your visit?: High Blood Pressure  Chronicity: recurrent  Onset: more than 1 year ago  Progression since onset: waxing and waning  Condition status: resistant  anxiety: No  blurred vision: No  chest pain: No  headaches: No  neck pain: No  orthopnea: No  palpitations: No  peripheral edema: No  PND: No  shortness of breath: No  sweats: No  Agents associated with hypertension: no associated agents  CAD risks: no known risk factors  Compliance problems: diet, exercise

## 2021-11-09 RX ORDER — COLCHICINE 0.6 MG/1
TABLET ORAL
Qty: 20 TABLET | Refills: 0 | Status: SHIPPED | OUTPATIENT
Start: 2021-11-09 | End: 2022-04-08 | Stop reason: SDUPTHER

## 2021-11-09 NOTE — TELEPHONE ENCOUNTER
Rx Refill Note  Requested Prescriptions     Pending Prescriptions Disp Refills   • colchicine 0.6 MG tablet [Pharmacy Med Name: COLCHICINE 0.6MG TABLETS] 20 tablet 0     Sig: TAKE 2 TABLETS BY MOUTH IMMEDIATELY, THEN 1 TABLET IN ONE HOUR, DO NOT REPEAT FOR 3 DAYS      Last office visit with prescribing clinician: 3/17/2021      Next office visit with prescribing clinician: Visit date not found     Comprehensive Metabolic Panel (03/31/2021 10:00)         Melanie Tapia, RT  11/09/21, 10:44 EST

## 2021-11-16 ENCOUNTER — TELEPHONE (OUTPATIENT)
Dept: FAMILY MEDICINE CLINIC | Facility: CLINIC | Age: 51
End: 2021-11-16

## 2021-11-16 NOTE — TELEPHONE ENCOUNTER
HUB to read.  PA was sent for Colchicine 0.6MG tablets    PA outcome: Available without authorization.

## 2021-12-07 NOTE — TELEPHONE ENCOUNTER
Azul Beckett, DO  to [facility]      12/7/21 1:12 PM  Note  Needs f/u bmp and looks due for annual PE

## 2021-12-08 DIAGNOSIS — N28.9 RENAL INSUFFICIENCY: Primary | ICD-10-CM

## 2021-12-08 RX ORDER — DILTIAZEM HYDROCHLORIDE 240 MG/1
240 CAPSULE, COATED, EXTENDED RELEASE ORAL DAILY
Qty: 90 CAPSULE | Refills: 0 | OUTPATIENT
Start: 2021-12-08

## 2021-12-08 RX ORDER — NEBIVOLOL 20 MG/1
20 TABLET ORAL DAILY
Qty: 90 TABLET | Refills: 0 | OUTPATIENT
Start: 2021-12-08

## 2021-12-08 NOTE — TELEPHONE ENCOUNTER
HUB TO READ    Attempted to call patient to schedule lab appt for f/u BMP and also an appt for his annual physical.  Voicemail has not been set up so was unable to leave [facility].

## 2021-12-09 RX ORDER — DILTIAZEM HYDROCHLORIDE 240 MG/1
240 CAPSULE, COATED, EXTENDED RELEASE ORAL DAILY
Qty: 90 CAPSULE | Refills: 0 | Status: SHIPPED | OUTPATIENT
Start: 2021-12-09 | End: 2022-03-10 | Stop reason: SDUPTHER

## 2021-12-09 RX ORDER — ALLOPURINOL 100 MG/1
100 TABLET ORAL DAILY
Qty: 90 TABLET | Refills: 1 | Status: SHIPPED | OUTPATIENT
Start: 2021-12-09 | End: 2022-04-21

## 2021-12-09 RX ORDER — NEBIVOLOL 20 MG/1
20 TABLET ORAL DAILY
Qty: 90 TABLET | Refills: 0 | Status: SHIPPED | OUTPATIENT
Start: 2021-12-09 | End: 2022-03-10 | Stop reason: SDUPTHER

## 2021-12-10 ENCOUNTER — CLINICAL SUPPORT (OUTPATIENT)
Dept: FAMILY MEDICINE CLINIC | Facility: CLINIC | Age: 51
End: 2021-12-10

## 2021-12-10 DIAGNOSIS — N28.9 RENAL INSUFFICIENCY: ICD-10-CM

## 2021-12-10 PROCEDURE — 36415 COLL VENOUS BLD VENIPUNCTURE: CPT | Performed by: FAMILY MEDICINE

## 2021-12-10 PROCEDURE — 80048 BASIC METABOLIC PNL TOTAL CA: CPT | Performed by: FAMILY MEDICINE

## 2021-12-11 LAB
ANION GAP SERPL CALCULATED.3IONS-SCNC: 10.3 MMOL/L (ref 5–15)
BUN SERPL-MCNC: 12 MG/DL (ref 6–20)
BUN/CREAT SERPL: 11.1 (ref 7–25)
CALCIUM SPEC-SCNC: 9 MG/DL (ref 8.6–10.5)
CHLORIDE SERPL-SCNC: 105 MMOL/L (ref 98–107)
CO2 SERPL-SCNC: 26.7 MMOL/L (ref 22–29)
CREAT SERPL-MCNC: 1.08 MG/DL (ref 0.76–1.27)
GFR SERPL CREATININE-BSD FRML MDRD: 72 ML/MIN/1.73
GLUCOSE SERPL-MCNC: 87 MG/DL (ref 65–99)
POTASSIUM SERPL-SCNC: 3.9 MMOL/L (ref 3.5–5.2)
SODIUM SERPL-SCNC: 142 MMOL/L (ref 136–145)

## 2021-12-13 ENCOUNTER — TELEPHONE (OUTPATIENT)
Dept: FAMILY MEDICINE CLINIC | Facility: CLINIC | Age: 51
End: 2021-12-13

## 2022-03-07 RX ORDER — DILTIAZEM HYDROCHLORIDE 240 MG/1
240 CAPSULE, COATED, EXTENDED RELEASE ORAL DAILY
Qty: 90 CAPSULE | Refills: 0 | OUTPATIENT
Start: 2022-03-07

## 2022-03-07 RX ORDER — NEBIVOLOL 20 MG/1
20 TABLET ORAL DAILY
Qty: 90 TABLET | Refills: 0 | OUTPATIENT
Start: 2022-03-07

## 2022-03-07 NOTE — TELEPHONE ENCOUNTER
Rx Refill Note  Requested Prescriptions     Pending Prescriptions Disp Refills   • nebivolol (BYSTOLIC) 20 MG tablet [Pharmacy Med Name: NEBIVOLOL 20MG TABLETS] 90 tablet 0     Sig: TAKE 1 TABLET BY MOUTH DAILY   • dilTIAZem CD (CARDIZEM CD) 240 MG 24 hr capsule [Pharmacy Med Name: DILTIAZEM CD 240MG CAPSULES (24 HR)] 90 capsule 0     Sig: TAKE 1 CAPSULE BY MOUTH DAILY      Last office visit with prescribing clinician: 3/17/2021      Next office visit with prescribing clinician: Visit date not found     Comprehensive Metabolic Panel (03/31/2021 10:00)  Lipid Panel (03/31/2021 10:00)         Nikia aHrmon CMA  03/07/22, 16:37 EST

## 2022-03-08 RX ORDER — NEBIVOLOL 20 MG/1
20 TABLET ORAL DAILY
Qty: 90 TABLET | Refills: 0 | OUTPATIENT
Start: 2022-03-08

## 2022-03-08 RX ORDER — DILTIAZEM HYDROCHLORIDE 240 MG/1
240 CAPSULE, COATED, EXTENDED RELEASE ORAL DAILY
Qty: 90 CAPSULE | Refills: 0 | OUTPATIENT
Start: 2022-03-08

## 2022-03-08 NOTE — TELEPHONE ENCOUNTER
HUB TO READ    Patient needs appt.  Attempted to call patient to schedule but no answer and no voicemail has been set up so unable to leave msg.

## 2022-03-08 NOTE — TELEPHONE ENCOUNTER
Rx Refill Note  Requested Prescriptions     Pending Prescriptions Disp Refills   • dilTIAZem CD (Cardizem CD) 240 MG 24 hr capsule 90 capsule 0     Sig: Take 1 capsule by mouth Daily.   • nebivolol (Bystolic) 20 MG tablet 90 tablet 0     Sig: Take 1 tablet by mouth Daily.      Last office visit with prescribing clinician: 3/17/2021      Next office visit with prescribing clinician: Visit date not found     Basic Metabolic Panel (12/10/2021 13:29)  Comprehensive Metabolic Panel (03/31/2021 10:00)  Lipid Panel (03/31/2021 10:00)         Nikia Harmon CMA  03/08/22, 12:25 EST

## 2022-03-10 ENCOUNTER — TELEPHONE (OUTPATIENT)
Dept: FAMILY MEDICINE CLINIC | Facility: CLINIC | Age: 52
End: 2022-03-10

## 2022-03-10 RX ORDER — NEBIVOLOL 20 MG/1
20 TABLET ORAL DAILY
Qty: 30 TABLET | Refills: 0 | Status: SHIPPED | OUTPATIENT
Start: 2022-03-10 | End: 2022-03-10 | Stop reason: SDUPTHER

## 2022-03-10 RX ORDER — DILTIAZEM HYDROCHLORIDE 240 MG/1
240 CAPSULE, COATED, EXTENDED RELEASE ORAL DAILY
Qty: 30 CAPSULE | Refills: 0 | Status: SHIPPED | OUTPATIENT
Start: 2022-03-10 | End: 2022-03-10 | Stop reason: SDUPTHER

## 2022-03-10 RX ORDER — NEBIVOLOL 20 MG/1
20 TABLET ORAL DAILY
Qty: 30 TABLET | Refills: 0 | Status: SHIPPED | OUTPATIENT
Start: 2022-03-10 | End: 2022-03-22

## 2022-03-10 RX ORDER — DILTIAZEM HYDROCHLORIDE 240 MG/1
240 CAPSULE, COATED, EXTENDED RELEASE ORAL DAILY
Qty: 30 CAPSULE | Refills: 0 | Status: SHIPPED | OUTPATIENT
Start: 2022-03-10 | End: 2022-03-22 | Stop reason: SDUPTHER

## 2022-03-10 RX ORDER — DILTIAZEM HYDROCHLORIDE 240 MG/1
240 CAPSULE, COATED, EXTENDED RELEASE ORAL DAILY
Qty: 90 CAPSULE | OUTPATIENT
Start: 2022-03-10

## 2022-03-10 NOTE — TELEPHONE ENCOUNTER
Caller: CHRISTELLE ROCHA    Relationship to patient: Emergency Contact    Best call back number: [phone]    Chief complaint: MED REFILL    Type of visit: OFFICE VISIT    Requested date: THIS WEEK     If rescheduling, when is the original appointment: N/A    Additional notes: PATIENT WILL RUN OUT OF BLOOD PRESSURE MEDICATION ON Sunday AND WAS TOLD THAT HE HAD TO BE SEEN FIRST. DR. JEREZ DOESN'T HAVE ANY APPOINTMENTS LEFT THIS WEEK. PATIENT WOULD LIKE TO BE WORKED IN.     IF HE CAN'T BE WORKED IN, CAN HE HAVE AN EMERGENCY 1 WEEK PRESCRIPTION UNTIL HE CAN BE SEEN?

## 2022-03-10 NOTE — TELEPHONE ENCOUNTER
Appt scheduled 3/17/2022. Will run out of medication before appt.  Requesting at least partial refill.

## 2022-03-22 ENCOUNTER — TELEPHONE (OUTPATIENT)
Dept: FAMILY MEDICINE CLINIC | Facility: CLINIC | Age: 52
End: 2022-03-22

## 2022-03-22 ENCOUNTER — OFFICE VISIT (OUTPATIENT)
Dept: FAMILY MEDICINE CLINIC | Facility: CLINIC | Age: 52
End: 2022-03-22

## 2022-03-22 VITALS
WEIGHT: 315 LBS | RESPIRATION RATE: 18 BRPM | HEIGHT: 74 IN | TEMPERATURE: 97.5 F | OXYGEN SATURATION: 96 % | HEART RATE: 68 BPM | DIASTOLIC BLOOD PRESSURE: 122 MMHG | SYSTOLIC BLOOD PRESSURE: 205 MMHG | BODY MASS INDEX: 40.43 KG/M2

## 2022-03-22 DIAGNOSIS — M10.072 IDIOPATHIC GOUT INVOLVING TOE OF LEFT FOOT, UNSPECIFIED CHRONICITY: ICD-10-CM

## 2022-03-22 DIAGNOSIS — I10 ELEVATED BLOOD PRESSURE READING IN OFFICE WITH DIAGNOSIS OF HYPERTENSION: Primary | ICD-10-CM

## 2022-03-22 DIAGNOSIS — Z13.220 SCREENING FOR LIPID DISORDERS: ICD-10-CM

## 2022-03-22 DIAGNOSIS — Z12.5 SCREENING FOR PROSTATE CANCER: ICD-10-CM

## 2022-03-22 PROCEDURE — 99213 OFFICE O/P EST LOW 20 MIN: CPT | Performed by: FAMILY MEDICINE

## 2022-03-22 RX ORDER — HYDROCHLOROTHIAZIDE 25 MG/1
25 TABLET ORAL DAILY
Qty: 30 TABLET | Refills: 1 | Status: SHIPPED | OUTPATIENT
Start: 2022-03-22 | End: 2022-04-12 | Stop reason: SDUPTHER

## 2022-03-22 RX ORDER — METOPROLOL SUCCINATE 50 MG/1
50 TABLET, EXTENDED RELEASE ORAL EVERY EVENING
Qty: 90 TABLET | OUTPATIENT
Start: 2022-03-22

## 2022-03-22 RX ORDER — HYDROCHLOROTHIAZIDE 25 MG/1
25 TABLET ORAL DAILY
Qty: 90 TABLET | OUTPATIENT
Start: 2022-03-22

## 2022-03-22 RX ORDER — DILTIAZEM HYDROCHLORIDE 240 MG/1
240 CAPSULE, COATED, EXTENDED RELEASE ORAL DAILY
Qty: 90 CAPSULE | Refills: 0 | Status: SHIPPED | OUTPATIENT
Start: 2022-03-22 | End: 2022-06-10

## 2022-03-22 RX ORDER — NEBIVOLOL HYDROCHLORIDE 20 MG/1
40 TABLET ORAL DAILY
Qty: 180 TABLET | Refills: 0 | Status: SHIPPED | OUTPATIENT
Start: 2022-03-22 | End: 2022-03-22

## 2022-03-22 RX ORDER — METOPROLOL SUCCINATE 50 MG/1
50 TABLET, EXTENDED RELEASE ORAL EVERY EVENING
Qty: 30 TABLET | Refills: 1 | Status: SHIPPED | OUTPATIENT
Start: 2022-03-22 | End: 2022-04-12 | Stop reason: SDUPTHER

## 2022-03-22 NOTE — TELEPHONE ENCOUNTER
Rx Refill Note  Requested Prescriptions     Pending Prescriptions Disp Refills   • metoprolol succinate XL (TOPROL-XL) 50 MG 24 hr tablet [Pharmacy Med Name: METOPROLOL ER SUCCINATE 50MG TABS] 90 tablet      Sig: TAKE 1 TABLET BY MOUTH EVERY EVENING      Last office visit with prescribing clinician: 3/22/2022      Next office visit with prescribing clinician: Visit date not found            Nikia Harmon, DAGMAR  03/22/22, 17:40 EDT

## 2022-03-22 NOTE — TELEPHONE ENCOUNTER
HUB to read    Bystolic PA: DRUG NOT COVERED    Insurance formulary alternatives:   atenolol tablet, metoprolol tartrate tablet, carvedilol tablet, metoprolol succinate ER tablet,extended release 24 hr AND bisoprolol fumarate tablet.    I spoke with the patients wife and she said he is ok with trying one of the formulary alternatives.

## 2022-04-08 ENCOUNTER — PATIENT MESSAGE (OUTPATIENT)
Dept: FAMILY MEDICINE CLINIC | Facility: CLINIC | Age: 52
End: 2022-04-08

## 2022-04-08 RX ORDER — COLCHICINE 0.6 MG/1
TABLET ORAL
Qty: 20 TABLET | Refills: 0 | Status: CANCELLED | OUTPATIENT
Start: 2022-04-08

## 2022-04-08 RX ORDER — COLCHICINE 0.6 MG/1
TABLET ORAL
Qty: 20 TABLET | Refills: 0 | Status: SHIPPED | OUTPATIENT
Start: 2022-04-08 | End: 2022-08-04

## 2022-04-08 NOTE — TELEPHONE ENCOUNTER
Rx Refill Note  Requested Prescriptions     Pending Prescriptions Disp Refills   • colchicine 0.6 MG tablet 20 tablet 0      Last office visit with prescribing clinician: 3/22/2022      Next office visit with prescribing clinician: Visit date not found     Lipid Panel (03/31/2021 10:00)         Melanie Tapia, RT  04/08/22, 12:03 EDT

## 2022-04-12 RX ORDER — METOPROLOL SUCCINATE 50 MG/1
50 TABLET, EXTENDED RELEASE ORAL EVERY EVENING
Qty: 90 TABLET | Refills: 0 | Status: SHIPPED | OUTPATIENT
Start: 2022-04-12 | End: 2022-08-09

## 2022-04-12 RX ORDER — HYDROCHLOROTHIAZIDE 25 MG/1
25 TABLET ORAL DAILY
Qty: 90 TABLET | Refills: 0 | Status: SHIPPED | OUTPATIENT
Start: 2022-04-12 | End: 2022-08-09

## 2022-04-12 NOTE — TELEPHONE ENCOUNTER
Rx Refill Note  Requested Prescriptions     Pending Prescriptions Disp Refills   • hydroCHLOROthiazide (HYDRODIURIL) 25 MG tablet 30 tablet 1     Sig: Take 1 tablet by mouth Daily.   • metoprolol succinate XL (Toprol XL) 50 MG 24 hr tablet 30 tablet 1     Sig: Take 1 tablet by mouth Every Evening.      Last office visit with prescribing clinician: 3/22/2022      Next office visit with prescribing clinician: Visit date not found     Basic Metabolic Panel (12/10/2021 13:29)  Lipid Panel (03/31/2021 10:00)  Comprehensive Metabolic Panel (03/31/2021 10:00)         Nikia Harmon CMA  04/12/22, 12:50 EDT

## 2022-04-20 ENCOUNTER — CLINICAL SUPPORT (OUTPATIENT)
Dept: FAMILY MEDICINE CLINIC | Facility: CLINIC | Age: 52
End: 2022-04-20

## 2022-04-20 ENCOUNTER — PATIENT MESSAGE (OUTPATIENT)
Dept: FAMILY MEDICINE CLINIC | Facility: CLINIC | Age: 52
End: 2022-04-20

## 2022-04-20 VITALS — SYSTOLIC BLOOD PRESSURE: 158 MMHG | DIASTOLIC BLOOD PRESSURE: 99 MMHG | HEART RATE: 87 BPM

## 2022-04-20 DIAGNOSIS — Z13.220 SCREENING FOR LIPID DISORDERS: ICD-10-CM

## 2022-04-20 DIAGNOSIS — Z12.5 SCREENING FOR PROSTATE CANCER: ICD-10-CM

## 2022-04-20 DIAGNOSIS — I10 ELEVATED BLOOD PRESSURE READING IN OFFICE WITH DIAGNOSIS OF HYPERTENSION: ICD-10-CM

## 2022-04-20 DIAGNOSIS — M10.072 IDIOPATHIC GOUT INVOLVING TOE OF LEFT FOOT, UNSPECIFIED CHRONICITY: ICD-10-CM

## 2022-04-20 PROCEDURE — 84550 ASSAY OF BLOOD/URIC ACID: CPT | Performed by: FAMILY MEDICINE

## 2022-04-20 PROCEDURE — 80061 LIPID PANEL: CPT | Performed by: FAMILY MEDICINE

## 2022-04-20 PROCEDURE — 36415 COLL VENOUS BLD VENIPUNCTURE: CPT | Performed by: FAMILY MEDICINE

## 2022-04-20 PROCEDURE — G0103 PSA SCREENING: HCPCS | Performed by: FAMILY MEDICINE

## 2022-04-20 PROCEDURE — 80053 COMPREHEN METABOLIC PANEL: CPT | Performed by: FAMILY MEDICINE

## 2022-04-20 NOTE — PROGRESS NOTES
Venipuncture performed in L ARM by RT Percy  with good hemostasis. Patient tolerated well. 04/20/22 RT Percy     BP's in chart vitals with 5 min wait interval    160/97 hr 86  158/99 hr 87

## 2022-04-20 NOTE — PROGRESS NOTES
Yes, he said you had added a recent HCTZ to his normal BP med, but it's only been a couple wks with the new one. States BP is lower than it had been and he does feel some better. Still make a f/u appt with you?

## 2022-04-21 DIAGNOSIS — M10.072 IDIOPATHIC GOUT INVOLVING TOE OF LEFT FOOT, UNSPECIFIED CHRONICITY: Primary | ICD-10-CM

## 2022-04-21 LAB
ALBUMIN SERPL-MCNC: 4.1 G/DL (ref 3.5–5.2)
ALBUMIN/GLOB SERPL: 1.1 G/DL
ALP SERPL-CCNC: 93 U/L (ref 39–117)
ALT SERPL W P-5'-P-CCNC: 23 U/L (ref 1–41)
ANION GAP SERPL CALCULATED.3IONS-SCNC: 16 MMOL/L (ref 5–15)
AST SERPL-CCNC: 25 U/L (ref 1–40)
BILIRUB SERPL-MCNC: 0.3 MG/DL (ref 0–1.2)
BUN SERPL-MCNC: 13 MG/DL (ref 6–20)
BUN/CREAT SERPL: 10.7 (ref 7–25)
CALCIUM SPEC-SCNC: 9.5 MG/DL (ref 8.6–10.5)
CHLORIDE SERPL-SCNC: 101 MMOL/L (ref 98–107)
CHOLEST SERPL-MCNC: 163 MG/DL (ref 0–200)
CO2 SERPL-SCNC: 27 MMOL/L (ref 22–29)
CREAT SERPL-MCNC: 1.22 MG/DL (ref 0.76–1.27)
EGFRCR SERPLBLD CKD-EPI 2021: 71.8 ML/MIN/1.73
GLOBULIN UR ELPH-MCNC: 3.7 GM/DL
GLUCOSE SERPL-MCNC: 99 MG/DL (ref 65–99)
HDLC SERPL-MCNC: 33 MG/DL (ref 40–60)
LDLC SERPL CALC-MCNC: 111 MG/DL (ref 0–100)
LDLC/HDLC SERPL: 3.32 {RATIO}
POTASSIUM SERPL-SCNC: 4.3 MMOL/L (ref 3.5–5.2)
PROT SERPL-MCNC: 7.8 G/DL (ref 6–8.5)
PSA SERPL-MCNC: 1.67 NG/ML (ref 0–4)
SODIUM SERPL-SCNC: 144 MMOL/L (ref 136–145)
TRIGL SERPL-MCNC: 102 MG/DL (ref 0–150)
URATE SERPL-MCNC: 11.7 MG/DL (ref 3.4–7)
VLDLC SERPL-MCNC: 19 MG/DL (ref 5–40)

## 2022-04-21 RX ORDER — ALLOPURINOL 300 MG/1
300 TABLET ORAL DAILY
Qty: 90 TABLET | Refills: 0 | Status: SHIPPED | OUTPATIENT
Start: 2022-04-21 | End: 2022-10-11

## 2022-04-22 ENCOUNTER — TELEPHONE (OUTPATIENT)
Dept: FAMILY MEDICINE CLINIC | Facility: CLINIC | Age: 52
End: 2022-04-22

## 2022-04-22 ENCOUNTER — PATIENT MESSAGE (OUTPATIENT)
Dept: FAMILY MEDICINE CLINIC | Facility: CLINIC | Age: 52
End: 2022-04-22

## 2022-04-22 NOTE — TELEPHONE ENCOUNTER
----- Message from Azul Beckett DO sent at 4/21/2022  5:48 PM EDT -----  PSA/ CMP/ LIPIDS ok  Uric acid level pretty high---sent out new allopurinol dose

## 2022-04-22 NOTE — TELEPHONE ENCOUNTER
HUB to read  My chart message was sent to the patient     PSA/ CMP/ LIPIDS ok   Uric acid level pretty high---sent out new allopurinol dose

## 2022-06-08 ENCOUNTER — CLINICAL SUPPORT (OUTPATIENT)
Dept: FAMILY MEDICINE CLINIC | Facility: CLINIC | Age: 52
End: 2022-06-08

## 2022-06-08 VITALS — SYSTOLIC BLOOD PRESSURE: 160 MMHG | DIASTOLIC BLOOD PRESSURE: 103 MMHG | HEART RATE: 78 BPM

## 2022-06-08 NOTE — PROGRESS NOTES
Sat for 5 min = 166/104 hr 78  5 min later - 160/103 hr 79     Pt states he really feels no diff from before you increased/added meds.

## 2022-06-10 RX ORDER — DILTIAZEM HYDROCHLORIDE 300 MG/1
300 CAPSULE, COATED, EXTENDED RELEASE ORAL DAILY
Qty: 90 CAPSULE | Refills: 0 | Status: SHIPPED | OUTPATIENT
Start: 2022-06-10 | End: 2022-09-15

## 2022-06-10 NOTE — PROGRESS NOTES
HTN is called the silent killer because you dont feel bad until you have a stroke or MI  Sent in higher dose of the cardiazem and can rech bp after 6-8weeks of this new dose

## 2022-06-13 ENCOUNTER — PATIENT MESSAGE (OUTPATIENT)
Dept: FAMILY MEDICINE CLINIC | Facility: CLINIC | Age: 52
End: 2022-06-13

## 2022-08-04 RX ORDER — COLCHICINE 0.6 MG/1
TABLET ORAL
Qty: 20 TABLET | Refills: 0 | Status: SHIPPED | OUTPATIENT
Start: 2022-08-04 | End: 2022-10-11 | Stop reason: SDUPTHER

## 2022-08-04 RX ORDER — COLCHICINE 0.6 MG/1
TABLET ORAL
Qty: 20 TABLET | Refills: 0 | OUTPATIENT
Start: 2022-08-04

## 2022-08-04 NOTE — TELEPHONE ENCOUNTER
Rx Refill Note  Requested Prescriptions     Pending Prescriptions Disp Refills   • colchicine 0.6 MG tablet [Pharmacy Med Name: COLCHICINE 0.6 MG TABLET] 20 tablet 0     Sig: TAKE 2 TABLETS BY MOUTH AS NEEDED FOR GOUT ATTACK THEN 1 TABLET AGAIN IN  1 HR , DO NOT REPEAT FOR 3 DAYS      Last office visit with prescribing clinician: 3/22/2022      Next office visit with prescribing clinician: 8/4/2022     Uric Acid (04/20/2022 10:22)         Melanie Tapia, RT  08/04/22, 08:40 EDT

## 2022-08-08 NOTE — TELEPHONE ENCOUNTER
Hub to read:     Per Dr Beckett - pt needs to come in for lab draw to check Uric Acid.     Called patient x2. No answer, no VM set up.

## 2022-08-09 RX ORDER — HYDROCHLOROTHIAZIDE 25 MG/1
TABLET ORAL
Qty: 90 TABLET | Refills: 0 | Status: SHIPPED | OUTPATIENT
Start: 2022-08-09 | End: 2022-10-11

## 2022-08-09 RX ORDER — METOPROLOL SUCCINATE 50 MG/1
TABLET, EXTENDED RELEASE ORAL
Qty: 90 TABLET | Refills: 0 | Status: SHIPPED | OUTPATIENT
Start: 2022-08-09 | End: 2022-10-11 | Stop reason: SDUPTHER

## 2022-09-12 NOTE — TELEPHONE ENCOUNTER
Rx Refill Note  Requested Prescriptions     Pending Prescriptions Disp Refills   • dilTIAZem CD (Cardizem CD) 300 MG 24 hr capsule 90 capsule 0     Sig: Take 1 capsule by mouth Daily.      Last office visit with prescribing clinician: 3/22/2022      Next office visit with prescribing clinician: Visit date not found     Lipid Panel (04/20/2022 10:22)  Comprehensive Metabolic Panel (04/20/2022 10:22)         Nikia Harmon CMA  09/12/22, 14:56 EDT

## 2022-09-13 RX ORDER — DILTIAZEM HYDROCHLORIDE 300 MG/1
300 CAPSULE, COATED, EXTENDED RELEASE ORAL DAILY
Qty: 90 CAPSULE | Refills: 0 | OUTPATIENT
Start: 2022-09-13

## 2022-09-13 NOTE — TELEPHONE ENCOUNTER
HUB TO READ    Patient needs lab appt for for f/u uric acid lab and also a f/u appt for BP per Dr. Beckett.    Attempted to call patient to schedule but no answer and no VM has been set up.  Unable to leave msg.

## 2022-09-14 ENCOUNTER — PATIENT MESSAGE (OUTPATIENT)
Dept: FAMILY MEDICINE CLINIC | Facility: CLINIC | Age: 52
End: 2022-09-14

## 2022-09-14 NOTE — TELEPHONE ENCOUNTER
HUB to share    Patient needs lab appt for for f/u uric acid lab and also a f/u appt for BP per Dr. Beckett.     Attempted to call patient to schedule but no answer and no VM has been set up.  Unable to leave msg.    Sent via [employer] msg, since unable to LVM

## 2022-09-15 RX ORDER — DILTIAZEM HYDROCHLORIDE 300 MG/1
300 CAPSULE, COATED, EXTENDED RELEASE ORAL DAILY
Qty: 30 CAPSULE | Refills: 0 | Status: SHIPPED | OUTPATIENT
Start: 2022-09-15 | End: 2022-10-11 | Stop reason: SDUPTHER

## 2022-09-15 RX ORDER — DILTIAZEM HYDROCHLORIDE 300 MG/1
300 CAPSULE, COATED, EXTENDED RELEASE ORAL DAILY
Qty: 90 CAPSULE | OUTPATIENT
Start: 2022-09-15

## 2022-09-15 NOTE — TELEPHONE ENCOUNTER
Boyd Reese Jr.  You 2 minutes ago (11:31 AM)     Only have 2 days left of the diltiazem  mg.  Will you call in a 30 day refill at [facility] in [place] since my appt is not until October 11 ? Thanks Mitch

## 2022-10-11 ENCOUNTER — OFFICE VISIT (OUTPATIENT)
Dept: FAMILY MEDICINE CLINIC | Facility: CLINIC | Age: 52
End: 2022-10-11

## 2022-10-11 VITALS
HEART RATE: 89 BPM | WEIGHT: 315 LBS | DIASTOLIC BLOOD PRESSURE: 115 MMHG | TEMPERATURE: 98.2 F | RESPIRATION RATE: 14 BRPM | OXYGEN SATURATION: 100 % | HEIGHT: 74 IN | SYSTOLIC BLOOD PRESSURE: 171 MMHG | BODY MASS INDEX: 40.43 KG/M2

## 2022-10-11 DIAGNOSIS — L71.9 ROSACEA: ICD-10-CM

## 2022-10-11 DIAGNOSIS — I10 ELEVATED BLOOD PRESSURE READING IN OFFICE WITH WHITE COAT SYNDROME, WITH DIAGNOSIS OF HYPERTENSION: Primary | ICD-10-CM

## 2022-10-11 DIAGNOSIS — M1A.0720 CHRONIC IDIOPATHIC GOUT INVOLVING TOE OF LEFT FOOT WITHOUT TOPHUS: ICD-10-CM

## 2022-10-11 PROCEDURE — 99213 OFFICE O/P EST LOW 20 MIN: CPT | Performed by: FAMILY MEDICINE

## 2022-10-11 RX ORDER — DOXYCYCLINE 40 MG/1
40 CAPSULE ORAL EVERY MORNING
Qty: 30 CAPSULE | Refills: 2 | Status: SHIPPED | OUTPATIENT
Start: 2022-10-11 | End: 2022-10-19

## 2022-10-11 RX ORDER — DILTIAZEM HYDROCHLORIDE 360 MG/1
360 CAPSULE, EXTENDED RELEASE ORAL DAILY
Qty: 90 CAPSULE | Refills: 0 | Status: SHIPPED | OUTPATIENT
Start: 2022-10-11 | End: 2023-01-18 | Stop reason: SDUPTHER

## 2022-10-11 RX ORDER — ALLOPURINOL 100 MG/1
200 TABLET ORAL DAILY
Qty: 60 TABLET | Refills: 2 | Status: SHIPPED | OUTPATIENT
Start: 2022-10-11 | End: 2022-10-11

## 2022-10-11 RX ORDER — ALLOPURINOL 100 MG/1
200 TABLET ORAL DAILY
Qty: 180 TABLET | Refills: 0 | Status: SHIPPED | OUTPATIENT
Start: 2022-10-11

## 2022-10-11 RX ORDER — COLCHICINE 0.6 MG/1
TABLET ORAL
Qty: 20 TABLET | Refills: 0 | Status: SHIPPED | OUTPATIENT
Start: 2022-10-11 | End: 2023-02-02

## 2022-10-11 RX ORDER — METOPROLOL SUCCINATE 50 MG/1
50 TABLET, EXTENDED RELEASE ORAL EVERY EVENING
Qty: 90 TABLET | Refills: 1 | Status: SHIPPED | OUTPATIENT
Start: 2022-10-11

## 2022-10-11 RX ORDER — DILTIAZEM HYDROCHLORIDE 300 MG/1
300 CAPSULE, COATED, EXTENDED RELEASE ORAL DAILY
Qty: 90 CAPSULE | Refills: 1 | Status: SHIPPED | OUTPATIENT
Start: 2022-10-11 | End: 2022-10-11

## 2022-10-11 NOTE — PROGRESS NOTES
Answers for HPI/ROS submitted by the patient on 10/11/2022  What is the primary reason for your visit?: High Blood Pressure  Chronicity: chronic  Onset: more than 1 year ago  Progression since onset: waxing and waning  Condition status: resistant  anxiety: No  blurred vision: No  chest pain: No  headaches: No  malaise/fatigue: No  neck pain: No  orthopnea: No  palpitations: No  peripheral edema: No  PND: No  shortness of breath: No  sweats: No  Agents associated with hypertension: no associated agents  CAD risks: obesity, sedentary lifestyle, stress  Compliance problems: diet, exercise    Subjective   Boyd Reese Jr. is a 52 y.o. male.     Chief Complaint   Patient presents with   • Hypertension         Current Outpatient Medications:   •  colchicine 0.6 MG tablet, TAKE 2 TABLETS BY MOUTH AS NEEDED FOR GOUT ATTACK THEN 1 TABLET AGAIN IN  1 HR , DO NOT REPEAT FOR 3 DAYS, Disp: 20 tablet, Rfl: 0  •  dilTIAZem CD (Cardizem CD) 360 MG 24 hr capsule, Take 1 capsule by mouth Daily., Disp: 90 capsule, Rfl: 0  •  metoprolol succinate XL (TOPROL-XL) 50 MG 24 hr tablet, Take 1 tablet by mouth Every Evening., Disp: 90 tablet, Rfl: 1  •  allopurinol (ZYLOPRIM) 100 MG tablet, TAKE 2 TABLETS BY MOUTH DAILY, Disp: 180 tablet, Rfl: 0  •  doxycycline (ORACEA) 40 MG capsule, Take 1 capsule by mouth Every Morning for 90 days., Disp: 30 capsule, Rfl: 2    Past Medical History:   Diagnosis Date   • Gout 10/24/2018   • Hypertension 10/24/2018   • Obesity 10/24/2018   • PSA     2021= 1.29/ 2022 =1.67       Past Surgical History:   Procedure Laterality Date   • COLONOSCOPY      COLOGUARD ------NEG = 2021, rech 2024       Family History   Problem Relation Age of Onset   • No Known Problems Mother    • Hypertension Father    • Seizures Sister        Social History     Socioeconomic History   • Marital status:    Tobacco Use   • Smoking status: Never   • Smokeless tobacco: Never   Vaping Use   • Vaping Use: Never used   Substance and  Sexual Activity   • Alcohol use: Yes     Comment: 4-6   • Drug use: No   • Sexual activity: Yes     Partners: Female     Birth control/protection: Post-menopausal       History of Present Illness  51 y/o C male here for f/u HTN/ Gout    Pt w/ min gout attacks despite stopping the allopurinol (due to feeling off on the 300mg dose)....  Pt's bp at home is usu 130's/80's w/o s/e  Hypertension  This is a chronic problem. The current episode started more than 1 year ago. The problem has been waxing and waning since onset. The problem is resistant. Pertinent negatives include no anxiety, blurred vision, chest pain, headaches, malaise/fatigue, neck pain, orthopnea, palpitations, peripheral edema, PND, shortness of breath or sweats. There are no associated agents to hypertension. Risk factors for coronary artery disease include obesity, sedentary lifestyle and stress. Compliance problems include diet and exercise.         The following portions of the patient's history were reviewed and updated as appropriate: allergies, current medications, past family history, past medical history, past social history, past surgical history and problem list.    Review of Systems   Constitutional: Negative for activity change, appetite change, fatigue, malaise/fatigue, unexpected weight gain and unexpected weight loss.   Eyes: Negative for blurred vision.   Respiratory: Negative for chest tightness, shortness of breath and wheezing.    Cardiovascular: Negative for chest pain, palpitations, orthopnea, leg swelling and PND.   Genitourinary: Negative for frequency, urgency and urinary incontinence.   Musculoskeletal: Negative for arthralgias, joint swelling, myalgias and neck pain.   Neurological: Negative for dizziness, facial asymmetry, speech difficulty, weakness, light-headedness, headache, memory problem and confusion.       Vitals:    10/11/22 0951   BP: (!) 171/115   Pulse: 89   Resp: 14   Temp: 98.2 °F (36.8 °C)   SpO2: 100%        Objective   Physical Exam  Vitals and nursing note reviewed.   Constitutional:       General: He is not in acute distress.     Appearance: He is well-developed. He is obese. He is not ill-appearing or toxic-appearing.   HENT:      Head: Normocephalic and atraumatic.   Cardiovascular:      Rate and Rhythm: Normal rate and regular rhythm.      Heart sounds: Normal heart sounds. No murmur heard.  Pulmonary:      Effort: Pulmonary effort is normal. No respiratory distress.      Breath sounds: Normal breath sounds. No stridor. No wheezing, rhonchi or rales.   Skin:     General: Skin is warm and dry.      Findings: Erythema and rash present. Rash is papular.      Comments: +erythematous papules scattered on face w/ mild rhinophyma   Neurological:      Mental Status: He is alert and oriented to person, place, and time.      Cranial Nerves: No cranial nerve deficit.   Psychiatric:         Attention and Perception: Attention and perception normal.         Mood and Affect: Mood and affect normal.         Speech: Speech normal.         Behavior: Behavior normal. Behavior is cooperative.         Thought Content: Thought content normal.         Cognition and Memory: Cognition and memory normal.         Judgment: Judgment normal.           Assessment & Plan   Diagnoses and all orders for this visit:    1. Elevated blood pressure reading in office with white coat syndrome, with diagnosis of hypertension (Primary)    2. Rosacea    3. Chronic idiopathic gout involving toe of left foot without tophus    Other orders  -     Discontinue: allopurinol (Zyloprim) 100 MG tablet; Take 2 tablets by mouth Daily.  Dispense: 60 tablet; Refill: 2  -     Discontinue: dilTIAZem CD (Cardizem CD) 300 MG 24 hr capsule; Take 1 capsule by mouth Daily.  Dispense: 90 capsule; Refill: 1  -     metoprolol succinate XL (TOPROL-XL) 50 MG 24 hr tablet; Take 1 tablet by mouth Every Evening.  Dispense: 90 tablet; Refill: 1  -     dilTIAZem CD (Cardizem  CD) 360 MG 24 hr capsule; Take 1 capsule by mouth Daily.  Dispense: 90 capsule; Refill: 0  -     colchicine 0.6 MG tablet; TAKE 2 TABLETS BY MOUTH AS NEEDED FOR GOUT ATTACK THEN 1 TABLET AGAIN IN  1 HR , DO NOT REPEAT FOR 3 DAYS  Dispense: 20 tablet; Refill: 0  -     doxycycline (ORACEA) 40 MG capsule; Take 1 capsule by mouth Every Morning for 90 days.  Dispense: 30 capsule; Refill: 2    Trial of doxycycline po for rosacea  Trial of allopurinol 200mg qday  Med refills  rech uric acid in 3mos

## 2022-10-17 ENCOUNTER — TELEPHONE (OUTPATIENT)
Dept: FAMILY MEDICINE CLINIC | Facility: CLINIC | Age: 52
End: 2022-10-17

## 2022-10-17 NOTE — TELEPHONE ENCOUNTER
Delayed release is what is indicated -----so since not covered, can try tetracycline bid and poss topical gel bid if ok w/ this

## 2022-10-17 NOTE — TELEPHONE ENCOUNTER
I received a fax from [employer] on [employer]inder stating that the Doxycycline Mono 40mg DR is not covered by his plan. The preferred alternative is Doxycycline Hyclate.

## 2022-10-19 ENCOUNTER — PATIENT MESSAGE (OUTPATIENT)
Dept: FAMILY MEDICINE CLINIC | Facility: CLINIC | Age: 52
End: 2022-10-19

## 2022-10-19 RX ORDER — METRONIDAZOLE 10 MG/G
GEL TOPICAL NIGHTLY
Qty: 60 G | Refills: 1 | Status: SHIPPED | OUTPATIENT
Start: 2022-10-19 | End: 2022-10-20

## 2022-10-19 RX ORDER — TETRACYCLINE HYDROCHLORIDE 250 MG/1
250 CAPSULE ORAL 2 TIMES DAILY
Qty: 60 CAPSULE | Refills: 2 | Status: SHIPPED | OUTPATIENT
Start: 2022-10-19 | End: 2023-02-02 | Stop reason: SDUPTHER

## 2022-10-19 NOTE — TELEPHONE ENCOUNTER
HUB to ead  HUB to ask questions  My chart message was sent to the patient.    Your insurance will not cover the doxycycline so  next recommendation is:  Patient can try tetracycline twice a day and possibly topical gel twice a day if he ok with this.    Are you with  sending that in instead? If so what pharmacy would you like it sent to?

## 2022-10-20 RX ORDER — METRONIDAZOLE 7.5 MG/G
GEL TOPICAL 2 TIMES DAILY
Qty: 45 G | Refills: 3 | Status: SHIPPED | OUTPATIENT
Start: 2022-10-20

## 2022-11-11 NOTE — TELEPHONE ENCOUNTER
Encounter date: 1:54 PM 11/11/2022    Source of History   Patient/father    Chief Complaint   Pt presents with:   Chest Pain (X 2 days post working out; was constant, now intermittent; denies cardiac hx; pt describes pain as mid sternal 6/10 with deep breaths)      History Of Present Illness   Gus Khan is a 15 y.o. male with no significant past medical history who presents to the ED with chief complaint of chest pain x3 days.  Patient states he has been in normal status of health for the last 2 weeks.  He does note 3 weeks ago he got over a viral illness at that time he was noted to be negative for COVID, strep and flu.  Over the last 3 days he is noted chest pain associated with deep breathing.  He states that he is been working out and he is a cross-country runner.  He states that he notes worsening chest pain with deep breathing.  He has no active chest pain at this time.  He does note that he thinks that this pain is due to working out.  He also notes intermittent feelings of food getting stuck in his throat.  He denies any weight loss he denies any difficulty with fluids.  He denies any nausea vomiting or diarrhea.  He denies any foul smelling breath or regurgitation.  He denies any trauma recent falls.  He denies any syncopal episodes.  No family history of sudden cardiac death.  He denies any anginal component to his chest pain.    This is the extent to the patients complaints today here in the emergency department.    Review Of Systems   As per HPI and below:  Constitutional: No fever.   HEENT: No voice change. No sore throat .    Eyes:  No visual disturbances. No eye pain.   Respiratory:  No shortness of breath. No wheezing. No cough.   Cardio:  + chest pain. No leg swelling. No palpitations.   GI:  No abdominal pain. No nausea or vomiting. No diarrhea.   :  No blood in urine. No difficulty urinating.   MSK:  No back pain. No neck pain.   Skin: No rash.   Neurologic: No headache. No dizziness.  Hub to read  My chart message was sent to the patient.     Results per :  COLOGUASHAZIA= NEGATIVE---recheck in 2024    "      Review of patient's allergies indicates:  No Known Allergies    No current facility-administered medications on file prior to encounter.     Current Outpatient Medications on File Prior to Encounter   Medication Sig Dispense Refill    fluticasone (FLONASE) 50 mcg/actuation nasal spray 1 spray by Each Nare route every evening.          Past History   As per HPI and below:  No past medical history on file.  Past Surgical History:   Procedure Laterality Date    ADENOIDECTOMY N/A 7/10/2018    Procedure: ADENOIDECTOMY;  Surgeon: Giacomo Ruth MD;  Location: [address];  Service: ENT;  Laterality: N/A;       Social History     Socioeconomic History    Marital status: Single   Tobacco Use    Smoking status: Never    Smokeless tobacco: Never   Substance and Sexual Activity    Alcohol use: Not Currently   Social History Narrative    Lives with both parents, Alma and Paresh, twin sister Ella.     3 dogs, Corrine, Beshirlene, and Young    Attends [employer] in the 9th grade.        Family History   Problem Relation Age of Onset    No Known Problems Mother     No Known Problems Father     No Known Problems Sister     Heart disease Maternal Grandfather     Arrhythmia Neg Hx     Congenital heart disease Neg Hx     Early death Neg Hx     Heart attacks under age 50 Neg Hx     Pacemaker/defibrilator Neg Hx        Physical Exam     Vitals:    11/11/22 1302   BP: 115/63   BP Location: Left arm   Patient Position: Sitting   Pulse: 72   Resp: 18   Temp: 97.9 °F (36.6 °C)   TempSrc: Oral   SpO2: 98%   Weight: 65.2 kg   Height: 5' 9" (1.753 m)     Physical Exam:   Nursing note and vitals reviewed.  Appearance:  Well-appearing, nontoxic adolescent male in no acute respiratory distress.  Making purposeful movements.  Speaking in full sentences.  Skin: No rashes seen.  Good turgor.  No abrasions.  No ecchymoses.  Eyes: No conjunctival injection. EOMI, PERRL.  Head: NC/AT  ENT: Oropharynx clear.    Chest: CTAB. No increased work of breathing, " bilateral chest rise.  Chest wall tender to palpation.  Cardiovascular: Regular rate and rhythm.  No murmurs rubs or gallops appreciated.  Normal equal bilateral radial pulses.  No lower extremity edema.  Abdomen: Soft.  Not distended.  Nontender.  No guarding.  No rebound. No Masses  Musculoskeletal: Good range of motion all joints.  No deformities.  Neck supple, full range of motion, no obvious deformity.  Neurologic: Moves all extremities.  Normal sensation.  No facial droop.  Normal speech.    Mental Status:  Alert and oriented x 3.  Appropriate, conversant.      Results and Medications    Procedures    Labs Reviewed - No data to display    Imaging Results              X-Ray Chest PA And Lateral (Final result)  Result time 11/11/22 14:33:59      Final result by Prudencio Hidalgo MD (11/11/22 14:33:59)                   Impression:      No active cardiac or pulmonary findings.      Electronically signed by: Prudencio Hidalgo  Date:    11/11/2022  Time:    14:33               Narrative:    EXAMINATION:  XR CHEST PA AND LATERAL    CLINICAL HISTORY:  Chest pain, unspecified    TECHNIQUE:  PA and lateral views of the chest were performed.    COMPARISON:  May 23, 2013    FINDINGS:  Normal heart size.  Normal pulmonary vasculature.  No focal infiltrates.  No pleural fluid or pneumothorax.  Midline tracheal air column.  No acute bony findings.                                          Medications - No data to display    MDM, Impression and Plan   Initial Assessment:   Urgent evaluation of 15 y.o. male with no significant past medical history presents the ED with chief complaint of chest pain x3 days and feelings of 50 eating 2nd his throat.  On arrival to the ED he is noted to be afebrile, hemodynamically stable and in no acute respiratory distress.  Vital signs stable.  Differential Diagnosis:   -unlikely ACS   -unlikely pericarditis   -atypical chest wall pain  -costochondritis   -GERD versus dyspepsia  -unlikely  pneumothorax but due to concern will obtain chest x-ray  Independently Interpreted Test(s):   EKG:  I independently reviewed and interpreted the EKG and my findings are as follows:   Detailed here or in ED course.  EKG shows normal sinus rhythm with ventricular rate of 69 beats per minute.  Narrow QRS.  No ST segment elevations depressions.  No STEMI.  Sinus arrhythmia appreciated.  No active chest pain.  Intervals within limits.  IMAGING:  I have ordered and independently interpreted X-rays and my findings are as follow:  Detailed here or in ED course.  Chest x-ray shows no pneumothorax pleural effusion or consolidative pneumonia.  Normal heart borders.    Clinical Tests:   Lab Tests: Ordered and Reviewed  Radiological Study: Ordered and Reviewed  Medical Tests: Ordered and Reviewed    ED Management:    Patient's vitals remained stable while he was in the ED.  Chest x-ray obtained with no abnormalities appreciated.  He has no pain at this time.  Walked around the ED with no return of pain.  He has reproducible chest wall tenderness making this most likely muscle skeletal in nature.  His EKG shows no signs of irregularities and he has no past medical history or family history of sudden cardiac death.  Patient takes meloxicam in the morning at night for a right ankle sprain.  I believe he should continue this medication for his costochondritis.  I believe the patient should be able to continue physical activity.  Strict return precautions were discussed.  I instructed the patient to disengage from physical activity if his symptoms are to worsen but at this time he is had no agent component to his chest wall pain.  Was also given a script for Prilosec as I believe that this will protect his stomach and will reduce his symptoms of dyspepsia that could be associated with his chronic NSAID use.  Patient and father agreeable with plan for discharge.  All questions answered.  Patient is to follow-up with his PCP in the  next 2-3 days.  Patient deemed stable for discharge.      Please see ED course for discussion of workup and dispo if not listed above.              Final diagnoses:  [R07.89] Chest wall pain (Primary)  [R10.13] Dyspepsia        ED Disposition Condition    Discharge Stable          ED Prescriptions       Medication Sig Dispense Start Date End Date Auth. Provider    omeprazole (PRILOSEC) 20 MG capsule Take 1 capsule (20 mg total) by mouth once daily. for 14 days 14 capsule 11/11/2022 11/25/2022 Mary Sheets MD          Follow-up Information       Follow up With Specialties Details Why Contact Info    Francy Conrad MD Pediatrics In 2 days  [address]  [address]  [facility] [address]  [phone]      [facility] - Emergency Dept Emergency Medicine  If symptoms worsen [address] [facility] [address] [phone]                          Mary Sheets MD   Emergency Resident   [phone] (spectra)    Please note that this dictation was completed with computer voicerecognition software.  Quite often unanticipated grammatical, syntax, homophones, and other interpretive errors are inadvertently transcribed by the computer software.  Please disregard these errors.  Please excuse any errors that have escaped final proofreading.         Mary Sheets MD  Resident  11/11/22 4024

## 2023-01-17 NOTE — TELEPHONE ENCOUNTER
Rx Refill Note  Requested Prescriptions     Pending Prescriptions Disp Refills   • dilTIAZem CD (CARDIZEM CD) 360 MG 24 hr capsule [Pharmacy Med Name: dilTIAZem HCl ER Coated Beads Oral Capsule Extended Release 24 Hour 360 MG] 90 capsule 0     Sig: TAKE 1 CAPSULE BY MOUTH EVERY DAY      Last office visit with prescribing clinician: 10/11/2022   Last telemedicine visit with prescribing clinician: Visit date not found   Next office visit with prescribing clinician: Visit date not found                       Lipid Panel (04/20/2022 10:22)    Would you like a call back once the refill request has been completed: [] Yes [] No    If the office needs to give you a call back, can they leave a voicemail: [] Yes [] No    Melanie Tapia, RT  01/17/23, 11:06 EST

## 2023-01-18 RX ORDER — DILTIAZEM HYDROCHLORIDE 360 MG/1
CAPSULE, EXTENDED RELEASE ORAL
Qty: 90 CAPSULE | Refills: 0 | OUTPATIENT
Start: 2023-01-18

## 2023-01-18 RX ORDER — BENAZEPRIL HYDROCHLORIDE 5 MG/1
5 TABLET, FILM COATED ORAL DAILY
Qty: 30 TABLET | Refills: 1 | Status: SHIPPED | OUTPATIENT
Start: 2023-01-18

## 2023-01-18 RX ORDER — DILTIAZEM HYDROCHLORIDE 360 MG/1
360 CAPSULE, EXTENDED RELEASE ORAL DAILY
Qty: 90 CAPSULE | Refills: 0 | Status: SHIPPED | OUTPATIENT
Start: 2023-01-18

## 2023-01-19 NOTE — TELEPHONE ENCOUNTER
Patient read this through his my chart message.    Per :  BP not controlled----added low dose benazepril and will need BP check in 1mo w/ staff

## 2023-02-02 RX ORDER — COLCHICINE 0.6 MG/1
TABLET ORAL
Qty: 20 TABLET | Refills: 0 | Status: SHIPPED | OUTPATIENT
Start: 2023-02-02

## 2023-02-02 RX ORDER — TETRACYCLINE HYDROCHLORIDE 250 MG/1
250 CAPSULE ORAL 2 TIMES DAILY
Qty: 60 CAPSULE | Refills: 0 | Status: SHIPPED | OUTPATIENT
Start: 2023-02-02

## 2023-02-02 NOTE — TELEPHONE ENCOUNTER
Rx Refill Note  Requested Prescriptions     Pending Prescriptions Disp Refills   • tetracycline (ACHROMYCIN,SUMYCIN) 250 MG capsule 60 capsule 2     Sig: Take 1 capsule by mouth 2 (Two) Times a Day.      Last office visit with prescribing clinician: 10/11/2022   Last telemedicine visit with prescribing clinician: Visit date not found   Next office visit with prescribing clinician: Visit date not found                       Lipid Panel (04/20/2022 10:22)    Would you like a call back once the refill request has been completed: [] Yes [] No    If the office needs to give you a call back, can they leave a voicemail: [] Yes [] No    Melanie Tapia, RT  02/02/23, 09:05 EST

## 2023-02-18 NOTE — TELEPHONE ENCOUNTER
We received a fax from [employer] on [employer] stating that metronidazole 1% topical gel tube is not covered by his insurance. Then it says Metronidazole is the preferred alternative...I put it on your desk.    4 = No assist / stand by assistance

## 2023-03-22 RX ORDER — BENAZEPRIL HYDROCHLORIDE 5 MG/1
5 TABLET, FILM COATED ORAL DAILY
Qty: 30 TABLET | Refills: 1 | OUTPATIENT
Start: 2023-03-22

## 2023-03-22 NOTE — TELEPHONE ENCOUNTER
Rx Refill Note  Requested Prescriptions     Pending Prescriptions Disp Refills   • benazepril (LOTENSIN) 5 MG tablet 30 tablet 1     Sig: Take 1 tablet by mouth Daily.      Last office visit with prescribing clinician: 10/11/2022   Last telemedicine visit with prescribing clinician: Visit date not found   Next office visit with prescribing clinician: Visit date not found                       Lipid Panel (04/20/2022 10:22)    Would you like a call back once the refill request has been completed: [] Yes [] No    If the office needs to give you a call back, can they leave a voicemail: [] Yes [] No    Melanie Tapia, RT  03/22/23, 11:32 EDT

## 2023-04-17 RX ORDER — TETRACYCLINE HYDROCHLORIDE 250 MG/1
CAPSULE ORAL
Qty: 60 CAPSULE | Refills: 0 | OUTPATIENT
Start: 2023-04-17

## 2023-04-17 NOTE — TELEPHONE ENCOUNTER
Rx Refill Note  Requested Prescriptions     Pending Prescriptions Disp Refills   • tetracycline (ACHROMYCIN,SUMYCIN) 250 MG capsule [Pharmacy Med Name: Tetracycline HCl Oral Capsule 250 MG] 60 capsule 0     Sig: TAKE 1 CAPSULE BY MOUTH 2 TIMES A DAY      Last office visit with prescribing clinician: Visit date not found   Last telemedicine visit with prescribing clinician: Visit date not found   Next office visit with prescribing clinician: Visit date not found                       Lipid Panel (04/20/2022 10:22)    Would you like a call back once the refill request has been completed: [] Yes [] No    If the office needs to give you a call back, can they leave a voicemail: [] Yes [] No    Melanie Tapia, RT  04/17/23, 16:27 EDT

## 2023-04-18 RX ORDER — METOPROLOL SUCCINATE 50 MG/1
TABLET, EXTENDED RELEASE ORAL
Qty: 90 TABLET | Refills: 0 | OUTPATIENT
Start: 2023-04-18

## 2023-04-18 RX ORDER — DILTIAZEM HYDROCHLORIDE 360 MG/1
CAPSULE, EXTENDED RELEASE ORAL
Qty: 90 CAPSULE | Refills: 0 | OUTPATIENT
Start: 2023-04-18

## 2023-04-18 NOTE — TELEPHONE ENCOUNTER
Rx Refill Note  Requested Prescriptions     Pending Prescriptions Disp Refills   • dilTIAZem CD (CARDIZEM CD) 360 MG 24 hr capsule [Pharmacy Med Name: dilTIAZem HCl ER Coated Beads Oral Capsule Extended Release 24 Hour 360 MG] 90 capsule 0     Sig: TAKE 1 CAPSULE BY MOUTH EVERY DAY   • metoprolol succinate XL (TOPROL-XL) 50 MG 24 hr tablet [Pharmacy Med Name: Metoprolol Succinate ER Oral Tablet Extended Release 24 Hour 50 MG] 90 tablet 0     Sig: TAKE 1 TABLET BY MOUTH IN THE EVENING      Last office visit with prescribing clinician: 10/11/2022   Last telemedicine visit with prescribing clinician: Visit date not found   Next office visit with prescribing clinician: Visit date not found                       Lipid Panel (04/20/2022 10:22)    Would you like a call back once the refill request has been completed: [] Yes [] No    If the office needs to give you a call back, can they leave a voicemail: [] Yes [] No    Melanie Tapia, RT  04/18/23, 09:09 EDT

## 2023-04-20 RX ORDER — DILTIAZEM HYDROCHLORIDE 360 MG/1
360 CAPSULE, EXTENDED RELEASE ORAL DAILY
Qty: 30 CAPSULE | Refills: 0 | Status: SHIPPED | OUTPATIENT
Start: 2023-04-20

## 2023-04-20 RX ORDER — METOPROLOL SUCCINATE 50 MG/1
50 TABLET, EXTENDED RELEASE ORAL EVERY EVENING
Qty: 30 TABLET | Refills: 0 | Status: SHIPPED | OUTPATIENT
Start: 2023-04-20

## 2023-04-20 RX ORDER — BENAZEPRIL HYDROCHLORIDE 5 MG/1
5 TABLET, FILM COATED ORAL DAILY
Qty: 30 TABLET | Refills: 0 | Status: SHIPPED | OUTPATIENT
Start: 2023-04-20

## 2023-04-21 RX ORDER — DILTIAZEM HYDROCHLORIDE 360 MG/1
360 CAPSULE, EXTENDED RELEASE ORAL DAILY
Qty: 90 CAPSULE | OUTPATIENT
Start: 2023-04-21

## 2023-04-21 RX ORDER — METOPROLOL SUCCINATE 50 MG/1
50 TABLET, EXTENDED RELEASE ORAL EVERY EVENING
Qty: 90 TABLET | OUTPATIENT
Start: 2023-04-21

## 2023-04-21 RX ORDER — BENAZEPRIL HYDROCHLORIDE 5 MG/1
5 TABLET, FILM COATED ORAL DAILY
Qty: 90 TABLET | OUTPATIENT
Start: 2023-04-21

## 2023-05-11 ENCOUNTER — OFFICE VISIT (OUTPATIENT)
Dept: FAMILY MEDICINE CLINIC | Facility: CLINIC | Age: 53
End: 2023-05-11
Payer: COMMERCIAL

## 2023-05-11 VITALS
HEIGHT: 74 IN | WEIGHT: 315 LBS | OXYGEN SATURATION: 95 % | SYSTOLIC BLOOD PRESSURE: 180 MMHG | RESPIRATION RATE: 16 BRPM | HEART RATE: 72 BPM | TEMPERATURE: 99.5 F | DIASTOLIC BLOOD PRESSURE: 113 MMHG | BODY MASS INDEX: 40.43 KG/M2

## 2023-05-11 DIAGNOSIS — L71.9 ROSACEA: ICD-10-CM

## 2023-05-11 DIAGNOSIS — I10 ELEVATED BLOOD PRESSURE READING WITH DIAGNOSIS OF HYPERTENSION: Primary | ICD-10-CM

## 2023-05-11 DIAGNOSIS — Z13.220 SCREENING FOR LIPID DISORDERS: ICD-10-CM

## 2023-05-11 DIAGNOSIS — M1A.0720 CHRONIC IDIOPATHIC GOUT INVOLVING TOE OF LEFT FOOT WITHOUT TOPHUS: ICD-10-CM

## 2023-05-11 DIAGNOSIS — Z12.5 SCREENING FOR PROSTATE CANCER: ICD-10-CM

## 2023-05-11 PROCEDURE — 99214 OFFICE O/P EST MOD 30 MIN: CPT | Performed by: FAMILY MEDICINE

## 2023-05-11 RX ORDER — BENAZEPRIL HYDROCHLORIDE 20 MG/1
20 TABLET ORAL DAILY
Qty: 90 TABLET | Refills: 0 | Status: SHIPPED | OUTPATIENT
Start: 2023-05-11

## 2023-05-11 RX ORDER — METOPROLOL SUCCINATE 50 MG/1
50 TABLET, EXTENDED RELEASE ORAL EVERY EVENING
Qty: 90 TABLET | Refills: 0 | Status: SHIPPED | OUTPATIENT
Start: 2023-05-11

## 2023-05-11 RX ORDER — DILTIAZEM HYDROCHLORIDE 360 MG/1
360 CAPSULE, EXTENDED RELEASE ORAL DAILY
Qty: 90 CAPSULE | Refills: 0 | Status: SHIPPED | OUTPATIENT
Start: 2023-05-11

## 2023-05-11 RX ORDER — MINOCYCLINE HYDROCHLORIDE 100 MG/1
100 CAPSULE ORAL DAILY
Qty: 90 CAPSULE | Refills: 0 | Status: SHIPPED | OUTPATIENT
Start: 2023-05-11

## 2023-05-11 RX ORDER — ALLOPURINOL 100 MG/1
200 TABLET ORAL DAILY
Qty: 60 TABLET | Refills: 0 | Status: SHIPPED | OUTPATIENT
Start: 2023-05-11

## 2023-05-11 NOTE — PROGRESS NOTES
Answers for HPI/ROS submitted by the patient on 5/10/2023  What is the primary reason for your visit?: High Blood Pressure  Chronicity: chronic  Onset: more than 1 year ago  Progression since onset: waxing and waning  Condition status: resistant  Agents associated with hypertension: no associated agents  CAD risks: obesity, sedentary lifestyle  Compliance problems: exercise    Subjective   Boyd Reese Jr. is a 53 y.o. male.     Chief Complaint   Patient presents with   • Hypertension   • Gout   • Rosacea         Current Outpatient Medications:   •  allopurinol (ZYLOPRIM) 100 MG tablet, Take 2 tablets by mouth Daily., Disp: 60 tablet, Rfl: 0  •  benazepril (LOTENSIN) 20 MG tablet, Take 1 tablet by mouth Daily., Disp: 90 tablet, Rfl: 0  •  colchicine 0.6 MG tablet, TAKE 2 TABLETS BY MOUTH AS NEEDED FOR GOUT ATTACK, THEN 1 TABLET AGAIN IN 1 HOUR. DO NOT REPEAT FOR 3 DAYS, Disp: 20 tablet, Rfl: 0  •  dilTIAZem CD (Cardizem CD) 360 MG 24 hr capsule, Take 1 capsule by mouth Daily., Disp: 90 capsule, Rfl: 0  •  metoprolol succinate XL (TOPROL-XL) 50 MG 24 hr tablet, Take 1 tablet by mouth Every Evening., Disp: 90 tablet, Rfl: 0  •  minocycline (Minocin) 100 MG capsule, Take 1 capsule by mouth Daily., Disp: 90 capsule, Rfl: 0    Past Medical History:   Diagnosis Date   • Gout 10/24/2018   • Hypertension 10/24/2018   • Obesity 10/24/2018   • PSA     2021= 1.29/ 2022 =1.67       Past Surgical History:   Procedure Laterality Date   • COLONOSCOPY      COLOGUARD ------NEG = 2021, rech 2024       Family History   Problem Relation Age of Onset   • No Known Problems Mother    • Hypertension Father    • Seizures Sister        Social History     Socioeconomic History   • Marital status:    Tobacco Use   • Smoking status: Never   • Smokeless tobacco: Never   Vaping Use   • Vaping Use: Never used   Substance and Sexual Activity   • Alcohol use: Yes     Alcohol/week: 7.0 standard drinks     Types: 7 Cans of beer per week   •  Drug use: No   • Sexual activity: Yes     Partners: Female     Birth control/protection: Post-menopausal       History of Present Illness  52 y/o C male here for fu on HTN/ Gout/ Rosacea  Hypertension  This is a chronic problem. The current episode started more than 1 year ago. The problem has been waxing and waning since onset. The problem is resistant. Pertinent negatives include no chest pain, palpitations or shortness of breath. There are no associated agents to hypertension. Risk factors for coronary artery disease include obesity and sedentary lifestyle. Compliance problems include exercise.      The patient reports readings of 140/90 mmHg at home. He denies any cough with his blood pressure medication.    The patient denies any chest pain, shortness of breath, pain in his upper extremities, constipation, diarrhea, or black stools.     He admits the gout medication works well for him and does not need to use it as much. He takes 1 pill daily. He is currently out of tetracycline. He takes tetracycline 1 daily. He admits to trying  the cream but prefers the pill. Pt states the med is a little pricey    The patient denies any new family medical issues. He reports drinking 6-8 beers per week during the summer. He denies exercising; however, he does swim in his pool during the summer.     The following portions of the patient's history were reviewed and updated as appropriate: allergies, current medications, past family history, past medical history, past social history, past surgical history and problem list.    Review of Systems   Constitutional: Negative for activity change, appetite change, fatigue, unexpected weight gain and unexpected weight loss.   Respiratory: Negative for cough, chest tightness and shortness of breath.    Cardiovascular: Negative for chest pain, palpitations and leg swelling.   Gastrointestinal: Negative for constipation, diarrhea, nausea, vomiting and GERD.   Genitourinary: Negative for  frequency, nocturia, urgency and urinary incontinence.   Musculoskeletal: Negative for arthralgias and myalgias.   Skin: Positive for rash.   Neurological: Negative for dizziness, facial asymmetry, speech difficulty, weakness, light-headedness, headache, memory problem and confusion.   Psychiatric/Behavioral: Negative for sleep disturbance.       Vitals:    05/11/23 1034   BP: (!) 180/113   Pulse: 72   Resp:    Temp:    SpO2: 95%       Objective   Physical Exam  Vitals and nursing note reviewed.   Constitutional:       General: He is not in acute distress.     Appearance: He is well-developed. He is obese. He is not ill-appearing.   HENT:      Head: Normocephalic and atraumatic.   Cardiovascular:      Rate and Rhythm: Normal rate and regular rhythm.      Heart sounds: Normal heart sounds. No murmur heard.  Pulmonary:      Effort: Pulmonary effort is normal. No respiratory distress.      Breath sounds: Normal breath sounds. No stridor. No wheezing, rhonchi or rales.   Musculoskeletal:      Cervical back: Normal range of motion and neck supple.   Skin:     General: Skin is warm and dry.      Findings: Erythema and rash present. Rash is papular (on b/l zygomatic/ nasal areas). Rash is not crusting or scaling.   Neurological:      Mental Status: He is alert and oriented to person, place, and time.      Cranial Nerves: No cranial nerve deficit.   Psychiatric:         Attention and Perception: Attention and perception normal.         Mood and Affect: Mood and affect normal.         Speech: Speech normal.         Behavior: Behavior normal. Behavior is cooperative.         Thought Content: Thought content normal.         Cognition and Memory: Cognition and memory normal.         Judgment: Judgment normal.           Assessment & Plan   Diagnoses and all orders for this visit:    1. Elevated blood pressure reading with diagnosis of hypertension (Primary)  -     Comprehensive Metabolic Panel; Future    2. 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 Rosacea    4. Screening for prostate cancer  -     PSA Screen; Future    5. Screening for lipid disorders  -     Lipid Panel; Future    Other orders  -     allopurinol (ZYLOPRIM) 100 MG tablet; Take 2 tablets by mouth Daily.  Dispense: 60 tablet; Refill: 0  -     benazepril (LOTENSIN) 20 MG tablet; Take 1 tablet by mouth Daily.  Dispense: 90 tablet; Refill: 0  -     dilTIAZem CD (Cardizem CD) 360 MG 24 hr capsule; Take 1 capsule by mouth Daily.  Dispense: 90 capsule; Refill: 0  -     metoprolol succinate XL (TOPROL-XL) 50 MG 24 hr tablet; Take 1 tablet by mouth Every Evening.  Dispense: 90 tablet; Refill: 0  -     minocycline (Minocin) 100 MG capsule; Take 1 capsule by mouth Daily.  Dispense: 90 capsule; Refill: 0    Trial of minocycline due to cost of the tetracycline  Med refills  Increase benazepril dose   Rx----Colchicine prn  Fasting labs w/ BP check in 1-2mos    Transcribed from ambient dictation for Azul Beckett DO by Megan Hair.  05/11/23   12:51 EDT    Patient or patient representative verbalized consent to the visit recording.  I have personally performed the services described in this document as transcribed by the above individual, and it is both accurate and complete.

## 2023-06-13 ENCOUNTER — CLINICAL SUPPORT (OUTPATIENT)
Dept: FAMILY MEDICINE CLINIC | Facility: CLINIC | Age: 53
End: 2023-06-13
Payer: COMMERCIAL

## 2023-06-13 VITALS — DIASTOLIC BLOOD PRESSURE: 109 MMHG | SYSTOLIC BLOOD PRESSURE: 182 MMHG | HEART RATE: 70 BPM

## 2023-06-13 DIAGNOSIS — Z13.220 SCREENING FOR LIPID DISORDERS: ICD-10-CM

## 2023-06-13 DIAGNOSIS — I10 ELEVATED BLOOD PRESSURE READING WITH DIAGNOSIS OF HYPERTENSION: ICD-10-CM

## 2023-06-13 DIAGNOSIS — Z12.5 SCREENING FOR PROSTATE CANCER: ICD-10-CM

## 2023-06-13 DIAGNOSIS — M1A.0720 CHRONIC IDIOPATHIC GOUT INVOLVING TOE OF LEFT FOOT WITHOUT TOPHUS: ICD-10-CM

## 2023-06-13 PROCEDURE — 36415 COLL VENOUS BLD VENIPUNCTURE: CPT | Performed by: FAMILY MEDICINE

## 2023-06-13 PROCEDURE — 80053 COMPREHEN METABOLIC PANEL: CPT | Performed by: FAMILY MEDICINE

## 2023-06-13 PROCEDURE — 80061 LIPID PANEL: CPT | Performed by: FAMILY MEDICINE

## 2023-06-13 PROCEDURE — 84550 ASSAY OF BLOOD/URIC ACID: CPT | Performed by: FAMILY MEDICINE

## 2023-06-13 PROCEDURE — G0103 PSA SCREENING: HCPCS | Performed by: FAMILY MEDICINE

## 2023-06-13 RX ORDER — BENAZEPRIL HYDROCHLORIDE 40 MG/1
40 TABLET, FILM COATED ORAL DAILY
Qty: 90 TABLET | Refills: 0 | Status: SHIPPED | OUTPATIENT
Start: 2023-06-13

## 2023-06-13 NOTE — PROGRESS NOTES
BP = 182/109 hr 70  175/110 hr 70  180/114 hr 73    Venipuncture performed in L ARM by RT Percy  with good hemostasis. Patient tolerated well. 06/13/23 Melanie Tapia RT       Dr Beckett informed of BP's. Sent in increased dose and pt will f/u w BP check in 6 wks.

## 2023-06-14 LAB
ALBUMIN SERPL-MCNC: 3.4 G/DL (ref 3.5–5.2)
ALBUMIN/GLOB SERPL: 0.9 G/DL
ALP SERPL-CCNC: 98 U/L (ref 39–117)
ALT SERPL W P-5'-P-CCNC: 26 U/L (ref 1–41)
ANION GAP SERPL CALCULATED.3IONS-SCNC: 10.4 MMOL/L (ref 5–15)
AST SERPL-CCNC: 28 U/L (ref 1–40)
BILIRUB SERPL-MCNC: 0.5 MG/DL (ref 0–1.2)
BUN SERPL-MCNC: 16 MG/DL (ref 6–20)
BUN/CREAT SERPL: 14 (ref 7–25)
CALCIUM SPEC-SCNC: 9.8 MG/DL (ref 8.6–10.5)
CHLORIDE SERPL-SCNC: 106 MMOL/L (ref 98–107)
CHOLEST SERPL-MCNC: 161 MG/DL (ref 0–200)
CO2 SERPL-SCNC: 27.6 MMOL/L (ref 22–29)
CREAT SERPL-MCNC: 1.14 MG/DL (ref 0.76–1.27)
EGFRCR SERPLBLD CKD-EPI 2021: 76.9 ML/MIN/1.73
GLOBULIN UR ELPH-MCNC: 3.9 GM/DL
GLUCOSE SERPL-MCNC: 94 MG/DL (ref 65–99)
HDLC SERPL-MCNC: 34 MG/DL (ref 40–60)
LDLC SERPL CALC-MCNC: 104 MG/DL (ref 0–100)
LDLC/HDLC SERPL: 2.98 {RATIO}
POTASSIUM SERPL-SCNC: 4.4 MMOL/L (ref 3.5–5.2)
PROT SERPL-MCNC: 7.3 G/DL (ref 6–8.5)
PSA SERPL-MCNC: 1.34 NG/ML (ref 0–4)
SODIUM SERPL-SCNC: 144 MMOL/L (ref 136–145)
TRIGL SERPL-MCNC: 129 MG/DL (ref 0–150)
URATE SERPL-MCNC: 5.7 MG/DL (ref 3.4–7)
VLDLC SERPL-MCNC: 23 MG/DL (ref 5–40)

## 2023-08-01 ENCOUNTER — CLINICAL SUPPORT (OUTPATIENT)
Dept: FAMILY MEDICINE CLINIC | Facility: CLINIC | Age: 53
End: 2023-08-01
Payer: COMMERCIAL

## 2023-08-01 VITALS — SYSTOLIC BLOOD PRESSURE: 182 MMHG | DIASTOLIC BLOOD PRESSURE: 108 MMHG | HEART RATE: 77 BPM

## 2023-08-02 ENCOUNTER — OFFICE VISIT (OUTPATIENT)
Dept: FAMILY MEDICINE CLINIC | Facility: CLINIC | Age: 53
End: 2023-08-02
Payer: COMMERCIAL

## 2023-08-02 VITALS
SYSTOLIC BLOOD PRESSURE: 176 MMHG | BODY MASS INDEX: 40.43 KG/M2 | TEMPERATURE: 98.7 F | HEIGHT: 74 IN | WEIGHT: 315 LBS | DIASTOLIC BLOOD PRESSURE: 115 MMHG | HEART RATE: 78 BPM | OXYGEN SATURATION: 97 % | RESPIRATION RATE: 14 BRPM

## 2023-08-02 DIAGNOSIS — Z79.899 LONG TERM CURRENT USE OF DIURETIC: ICD-10-CM

## 2023-08-02 DIAGNOSIS — I10 PRIMARY HYPERTENSION: Primary | ICD-10-CM

## 2023-08-02 PROCEDURE — 99213 OFFICE O/P EST LOW 20 MIN: CPT | Performed by: FAMILY MEDICINE

## 2023-08-02 RX ORDER — MINOCYCLINE HYDROCHLORIDE 100 MG/1
100 CAPSULE ORAL DAILY
Qty: 90 CAPSULE | Refills: 2 | Status: SHIPPED | OUTPATIENT
Start: 2023-08-02

## 2023-08-02 RX ORDER — HYDROCHLOROTHIAZIDE 12.5 MG/1
12.5 TABLET ORAL DAILY
Qty: 30 TABLET | Refills: 1 | Status: SHIPPED | OUTPATIENT
Start: 2023-08-02

## 2023-08-02 RX ORDER — METOPROLOL SUCCINATE 50 MG/1
50 TABLET, EXTENDED RELEASE ORAL EVERY EVENING
Qty: 90 TABLET | Refills: 1 | Status: SHIPPED | OUTPATIENT
Start: 2023-08-02

## 2023-08-02 RX ORDER — DILTIAZEM HYDROCHLORIDE 360 MG/1
360 CAPSULE, EXTENDED RELEASE ORAL DAILY
Qty: 90 CAPSULE | Refills: 1 | Status: SHIPPED | OUTPATIENT
Start: 2023-08-02

## 2023-08-02 RX ORDER — ALLOPURINOL 100 MG/1
200 TABLET ORAL DAILY
Qty: 180 TABLET | Refills: 2 | Status: SHIPPED | OUTPATIENT
Start: 2023-08-02

## 2023-09-08 ENCOUNTER — PATIENT ROUNDING (BHMG ONLY) (OUTPATIENT)
Dept: FAMILY MEDICINE CLINIC | Facility: CLINIC | Age: 53
End: 2023-09-08
Payer: COMMERCIAL

## 2023-09-13 ENCOUNTER — CLINICAL SUPPORT (OUTPATIENT)
Dept: FAMILY MEDICINE CLINIC | Facility: CLINIC | Age: 53
End: 2023-09-13
Payer: COMMERCIAL

## 2023-09-13 VITALS — DIASTOLIC BLOOD PRESSURE: 100 MMHG | HEART RATE: 82 BPM | SYSTOLIC BLOOD PRESSURE: 160 MMHG

## 2023-09-13 PROCEDURE — 80048 BASIC METABOLIC PNL TOTAL CA: CPT | Performed by: FAMILY MEDICINE

## 2023-09-13 NOTE — PROGRESS NOTES
188/113 hr 78 - pt cuff  160/100 hr 82 - manual p/ CT      Venipuncture performed in L ARM by RT Percy  with good hemostasis. Patient tolerated well. 09/13/23 Melanie Tapia, RT

## 2023-09-14 LAB
ANION GAP SERPL CALCULATED.3IONS-SCNC: 12.8 MMOL/L (ref 5–15)
BUN SERPL-MCNC: 14 MG/DL (ref 6–20)
BUN/CREAT SERPL: 11.5 (ref 7–25)
CALCIUM SPEC-SCNC: 9.5 MG/DL (ref 8.6–10.5)
CHLORIDE SERPL-SCNC: 102 MMOL/L (ref 98–107)
CO2 SERPL-SCNC: 25.2 MMOL/L (ref 22–29)
CREAT SERPL-MCNC: 1.22 MG/DL (ref 0.76–1.27)
EGFRCR SERPLBLD CKD-EPI 2021: 70.9 ML/MIN/1.73
GLUCOSE SERPL-MCNC: 98 MG/DL (ref 65–99)
POTASSIUM SERPL-SCNC: 4.5 MMOL/L (ref 3.5–5.2)
SODIUM SERPL-SCNC: 140 MMOL/L (ref 136–145)

## 2023-09-19 RX ORDER — BENAZEPRIL HYDROCHLORIDE 40 MG/1
40 TABLET, FILM COATED ORAL DAILY
Qty: 90 TABLET | Refills: 0 | Status: SHIPPED | OUTPATIENT
Start: 2023-09-19

## 2023-09-29 RX ORDER — COLCHICINE 0.6 MG/1
TABLET ORAL
Qty: 20 TABLET | Refills: 0 | Status: SHIPPED | OUTPATIENT
Start: 2023-09-29

## 2023-09-29 NOTE — TELEPHONE ENCOUNTER
Rx Refill Note  Requested Prescriptions     Pending Prescriptions Disp Refills    colchicine 0.6 MG tablet [Pharmacy Med Name: Colchicine Oral Tablet 0.6 MG] 20 tablet 0     Sig: TAKE 2 TABLETS BY MOUTH AS NEEDED FOR GOUT ATTACK, THEN 1 TABLET AGAIN IN 1 HOUR. DO NOT REPEAT FOR 3 DAYS      Last office visit with prescribing clinician: 8/2/2023   Last telemedicine visit with prescribing clinician: Visit date not found   Next office visit with prescribing clinician: Visit date not found                         Would you like a call back once the refill request has been completed: [] Yes [] No    If the office needs to give you a call back, can they leave a voicemail: [] Yes [] No    Melanie Tapia, RT  09/29/23, 13:10 EDT

## 2023-11-07 RX ORDER — HYDROCHLOROTHIAZIDE 12.5 MG/1
12.5 TABLET ORAL DAILY
Qty: 90 TABLET | Refills: 0 | Status: SHIPPED | OUTPATIENT
Start: 2023-11-07

## 2023-12-26 RX ORDER — BENAZEPRIL HYDROCHLORIDE 40 MG/1
40 TABLET ORAL DAILY
Qty: 90 TABLET | Refills: 0 | Status: SHIPPED | OUTPATIENT
Start: 2023-12-26

## 2023-12-26 RX ORDER — DOXAZOSIN MESYLATE 1 MG/1
1 TABLET ORAL NIGHTLY
Qty: 90 TABLET | Refills: 0 | Status: SHIPPED | OUTPATIENT
Start: 2023-12-26

## 2023-12-26 RX ORDER — COLCHICINE 0.6 MG/1
TABLET ORAL
Qty: 20 TABLET | Refills: 0 | Status: SHIPPED | OUTPATIENT
Start: 2023-12-26

## 2023-12-26 NOTE — TELEPHONE ENCOUNTER
Rx Refill Note  Requested Prescriptions     Pending Prescriptions Disp Refills    colchicine 0.6 MG tablet [Pharmacy Med Name: Colchicine Oral Tablet 0.6 MG] 20 tablet 0     Sig: TAKE 2 TABLETS BY MOUTH AS NEEDED FOR GOUT ATTACK, THEN 1 TABLET AGAIN IN 1 HOUR. DO NOT REPEAT FOR 3 DAYS      Last office visit with prescribing clinician: 8/2/2023   Last telemedicine visit with prescribing clinician: Visit date not found   Next office visit with prescribing clinician: 12/26/2023        Lipid Panel (06/13/2023 10:01)                  Would you like a call back once the refill request has been completed: [] Yes [] No    If the office needs to give you a call back, can they leave a voicemail: [] Yes [] No    Melanie Tapia, RT  12/26/23, 12:01 EST

## 2024-01-01 NOTE — TELEPHONE ENCOUNTER
Rx Refill Note  Requested Prescriptions     Pending Prescriptions Disp Refills   • allopurinol (ZYLOPRIM) 100 MG tablet [Pharmacy Med Name: ALLOPURINOL 100MG TABLETS] 180 tablet      Sig: TAKE 2 TABLETS BY MOUTH DAILY      Last office visit with prescribing clinician: 10/11/2022      Next office visit with prescribing clinician: Visit date not found     Lipid Panel (04/20/2022 10:22)         Nikia Harmon CMA  10/11/22, 13:00 EDT   T(C): 35.8 (01-01-24 @ 10:34), Max: 35.8 (01-01-24 @ 10:34)  HR: 120 (01-01-24 @ 10:34) (120 - 120)  BP: 149/78 (01-01-24 @ 10:34) (149/78 - 149/78)  RR: 20 (01-01-24 @ 10:34) (20 - 20)  SpO2: 96% (01-01-24 @ 10:34) (96% - 96%)    PHYSICAL EXAM:  GENERAL: laying in bed, appearing comfortable and in NAD  HEENT: Dry mucous membranes  NECK: Supple  CHEST/LUNG: even respirations b/l; no accessory muscle use; (+)  wheeze and rhonchi upon auscultation of base of left lung  ABDOMEN: Soft, Nontender, Nondistended  : b/l scrotal edema. no pain upon palpitation  per pt; + b/l erythematous skin changes overlying groin and inguinal folds appearing consistent w/ candida   EXTREMITIES:   No calf TTP b/l  SKIN: warm T(C): 35.8 (01-01-24 @ 10:34), Max: 35.8 (01-01-24 @ 10:34)  HR: 120 (01-01-24 @ 10:34) (120 - 120)  BP: 149/78 (01-01-24 @ 10:34) (149/78 - 149/78)  RR: 20 (01-01-24 @ 10:34) (20 - 20)  SpO2: 96% (01-01-24 @ 10:34) (96% - 96%)    PHYSICAL EXAM:  GENERAL: laying in bed, appearing comfortable and in NAD  HEENT: Dry mucous membranes  NECK: Supple  CHEST/LUNG: even respirations b/l; no accessory muscle use; (+)  wheeze and rhonchi upon auscultation of base of left lung  ABDOMEN: Soft, Nontender, Nondistended  : b/l scrotal edema. no pain upon palpitation  per pt; + b/l erythema and scaling overlying groin and inguinal folds appearing consistent w/ scrotal dermatitis   EXTREMITIES:   No calf TTP b/l  SKIN: warm

## 2024-02-13 RX ORDER — HYDROCHLOROTHIAZIDE 12.5 MG/1
12.5 TABLET ORAL DAILY
Qty: 90 TABLET | Refills: 0 | OUTPATIENT
Start: 2024-02-13

## 2024-02-13 RX ORDER — DILTIAZEM HYDROCHLORIDE 360 MG/1
360 CAPSULE, EXTENDED RELEASE ORAL DAILY
Qty: 90 CAPSULE | Refills: 0 | OUTPATIENT
Start: 2024-02-13

## 2024-02-16 RX ORDER — DILTIAZEM HYDROCHLORIDE 360 MG/1
360 CAPSULE, EXTENDED RELEASE ORAL DAILY
Qty: 90 CAPSULE | Refills: 1 | OUTPATIENT
Start: 2024-02-16

## 2024-02-16 RX ORDER — HYDROCHLOROTHIAZIDE 12.5 MG/1
12.5 TABLET ORAL DAILY
Qty: 90 TABLET | Refills: 0 | OUTPATIENT
Start: 2024-02-16

## 2024-02-16 NOTE — TELEPHONE ENCOUNTER
Rx Refill Note  Requested Prescriptions     Pending Prescriptions Disp Refills    dilTIAZem CD (Cardizem CD) 360 MG 24 hr capsule 90 capsule 1     Sig: Take 1 capsule by mouth Daily.    hydroCHLOROthiazide 12.5 MG tablet 90 tablet 0     Sig: Take 1 tablet by mouth Daily.      Last office visit with prescribing clinician: 8/2/2023   Last telemedicine visit with prescribing clinician: Visit date not found   Next office visit with prescribing clinician: Visit date not found        Lipid Panel (06/13/2023 10:01)                  Would you like a call back once the refill request has been completed: [] Yes [] No    If the office needs to give you a call back, can they leave a voicemail: [] Yes [] No    Melanie Tapia, RT  02/16/24, 09:28 EST

## 2024-02-20 RX ORDER — HYDROCHLOROTHIAZIDE 12.5 MG/1
12.5 TABLET ORAL DAILY
Qty: 30 TABLET | Refills: 0 | Status: SHIPPED | OUTPATIENT
Start: 2024-02-20 | End: 2024-02-22 | Stop reason: SDUPTHER

## 2024-02-20 RX ORDER — DILTIAZEM HYDROCHLORIDE 360 MG/1
360 CAPSULE, EXTENDED RELEASE ORAL DAILY
Qty: 30 CAPSULE | Refills: 0 | Status: SHIPPED | OUTPATIENT
Start: 2024-02-20 | End: 2024-02-22 | Stop reason: SDUPTHER

## 2024-02-22 ENCOUNTER — OFFICE VISIT (OUTPATIENT)
Dept: FAMILY MEDICINE CLINIC | Facility: CLINIC | Age: 54
End: 2024-02-22
Payer: COMMERCIAL

## 2024-02-22 VITALS
TEMPERATURE: 98.4 F | WEIGHT: 315 LBS | HEART RATE: 82 BPM | RESPIRATION RATE: 18 BRPM | SYSTOLIC BLOOD PRESSURE: 162 MMHG | DIASTOLIC BLOOD PRESSURE: 102 MMHG | OXYGEN SATURATION: 97 % | BODY MASS INDEX: 40.43 KG/M2 | HEIGHT: 74 IN

## 2024-02-22 DIAGNOSIS — Z12.5 SCREENING FOR PROSTATE CANCER: ICD-10-CM

## 2024-02-22 DIAGNOSIS — E66.01 CLASS 3 SEVERE OBESITY DUE TO EXCESS CALORIES WITH SERIOUS COMORBIDITY AND BODY MASS INDEX (BMI) OF 45.0 TO 49.9 IN ADULT: ICD-10-CM

## 2024-02-22 DIAGNOSIS — Z13.220 SCREENING FOR LIPID DISORDERS: ICD-10-CM

## 2024-02-22 DIAGNOSIS — M1A.0720 CHRONIC IDIOPATHIC GOUT INVOLVING TOE OF LEFT FOOT WITHOUT TOPHUS: ICD-10-CM

## 2024-02-22 DIAGNOSIS — L71.9 ROSACEA: ICD-10-CM

## 2024-02-22 DIAGNOSIS — Z12.11 COLON CANCER SCREENING: ICD-10-CM

## 2024-02-22 DIAGNOSIS — I10 ELEVATED BLOOD PRESSURE READING IN OFFICE WITH DIAGNOSIS OF HYPERTENSION: Primary | ICD-10-CM

## 2024-02-22 RX ORDER — HYDROCHLOROTHIAZIDE 12.5 MG/1
12.5 TABLET ORAL DAILY
Qty: 90 TABLET | Refills: 0 | Status: SHIPPED | OUTPATIENT
Start: 2024-02-22 | End: 2024-02-23 | Stop reason: SDUPTHER

## 2024-02-22 RX ORDER — ALLOPURINOL 100 MG/1
200 TABLET ORAL DAILY
Qty: 180 TABLET | Refills: 2 | Status: SHIPPED | OUTPATIENT
Start: 2024-02-22

## 2024-02-22 RX ORDER — MINOCYCLINE HYDROCHLORIDE 100 MG/1
100 CAPSULE ORAL DAILY
Qty: 90 CAPSULE | Refills: 2 | Status: SHIPPED | OUTPATIENT
Start: 2024-02-22

## 2024-02-22 RX ORDER — DILTIAZEM HYDROCHLORIDE 360 MG/1
360 CAPSULE, EXTENDED RELEASE ORAL DAILY
Qty: 90 CAPSULE | Refills: 0 | Status: SHIPPED | OUTPATIENT
Start: 2024-02-22 | End: 2024-02-23 | Stop reason: SDUPTHER

## 2024-02-22 RX ORDER — BENAZEPRIL HYDROCHLORIDE 40 MG/1
40 TABLET ORAL DAILY
Qty: 90 TABLET | Refills: 0 | Status: SHIPPED | OUTPATIENT
Start: 2024-02-22

## 2024-02-22 RX ORDER — METOPROLOL SUCCINATE 50 MG/1
50 TABLET, EXTENDED RELEASE ORAL EVERY EVENING
Qty: 90 TABLET | Refills: 0 | Status: SHIPPED | OUTPATIENT
Start: 2024-02-22 | End: 2024-02-23 | Stop reason: SDUPTHER

## 2024-02-22 RX ORDER — SEMAGLUTIDE 0.68 MG/ML
INJECTION, SOLUTION SUBCUTANEOUS
Qty: 3 ML | Refills: 0 | COMMUNITY
Start: 2024-02-22

## 2024-02-22 RX ORDER — DOXAZOSIN MESYLATE 1 MG/1
1 TABLET ORAL NIGHTLY
Qty: 90 TABLET | Refills: 0 | Status: SHIPPED | OUTPATIENT
Start: 2024-02-22 | End: 2024-02-23 | Stop reason: SDUPTHER

## 2024-02-22 NOTE — PROGRESS NOTES
Subjective   Boyd Reese Jr. is a 53 y.o. male.     Chief Complaint   Patient presents with    Hypertension    Gout    Rosacea         Current Outpatient Medications:     allopurinol (ZYLOPRIM) 100 MG tablet, Take 2 tablets by mouth Daily., Disp: 180 tablet, Rfl: 2    benazepril (LOTENSIN) 40 MG tablet, Take 1 tablet by mouth Daily., Disp: 90 tablet, Rfl: 0    colchicine 0.6 MG tablet, TAKE 2 TABLETS BY MOUTH AS NEEDED FOR GOUT ATTACK, THEN 1 TABLET AGAIN IN 1 HOUR. DO NOT REPEAT FOR 3 DAYS, Disp: 20 tablet, Rfl: 0    dilTIAZem CD (Cardizem CD) 360 MG 24 hr capsule, Take 1 capsule by mouth Daily., Disp: 90 capsule, Rfl: 0    doxazosin (Cardura) 1 MG tablet, Take 1 tablet by mouth Every Night., Disp: 90 tablet, Rfl: 0    hydroCHLOROthiazide 12.5 MG tablet, Take 1 tablet by mouth Daily., Disp: 90 tablet, Rfl: 0    metoprolol succinate XL (TOPROL-XL) 50 MG 24 hr tablet, Take 1 tablet by mouth Every Evening., Disp: 90 tablet, Rfl: 0    minocycline (Minocin) 100 MG capsule, Take 1 capsule by mouth Daily., Disp: 90 capsule, Rfl: 2    Semaglutide,0.25 or 0.5MG/DOS, (Ozempic, 0.25 or 0.5 MG/DOSE,) 2 MG/3ML solution pen-injector, 0.25mg SQ Q7days x 1 mo then goto 0.5mg SQ Q7days, Disp: 3 mL, Rfl: 0    Past Medical History:   Diagnosis Date    Gout 10/24/2018    Hypertension 10/24/2018    Obesity 10/24/2018    PSA     2021= 1.29/ 2022 =1.67/ 2023= 1.34       Past Surgical History:   Procedure Laterality Date    COLONOSCOPY      COLOGUARD ------NEG = 2021, rech 2024       Family History   Problem Relation Age of Onset    No Known Problems Mother     Hypertension Father     Seizures Sister        Social History     Socioeconomic History    Marital status:    Tobacco Use    Smoking status: Never     Passive exposure: Past    Smokeless tobacco: Never   Vaping Use    Vaping Use: Never used   Substance and Sexual Activity    Alcohol use: Yes     Alcohol/week: 7.0 standard drinks of alcohol     Types: 7 Cans of beer per week     Drug use: No    Sexual activity: Yes     Partners: Female     Birth control/protection: Post-menopausal       Hypertension  Pertinent negatives include no blurred vision, chest pain, palpitations or shortness of breath.   Gout  Pertinent negatives include no arthralgias, chest pain, coughing, fatigue, myalgias or weakness.   Rosacea  Pertinent negatives include no arthralgias, chest pain, coughing, fatigue, myalgias or weakness.      Pt states his gout is well controlled and the pill for his rosacea is working.  Pt ran out of one of his meds a few days ago but also not checking his BP at home  Pt states he has been trying to eat healthier and walk more........     The following portions of the patient's history were reviewed and updated as appropriate: allergies, current medications, past family history, past medical history, past social history, past surgical history and problem list.    Review of Systems   Constitutional:  Negative for activity change, appetite change, fatigue, unexpected weight gain and unexpected weight loss.   Eyes:  Negative for blurred vision.   Respiratory:  Negative for cough, chest tightness and shortness of breath.    Cardiovascular:  Negative for chest pain, palpitations and leg swelling.   Genitourinary:  Negative for frequency, urgency and urinary incontinence.   Musculoskeletal:  Positive for gout. Negative for arthralgias and myalgias.   Neurological:  Negative for dizziness, facial asymmetry, speech difficulty, weakness, light-headedness, headache, memory problem and confusion.       Vitals:    02/22/24 1135   BP: (!) 162/102   Pulse: 82   Resp: 18   Temp: 98.4 °F (36.9 °C)   SpO2: 97%     BMI=45.58  Objective   Physical Exam  Vitals and nursing note reviewed.   Constitutional:       General: He is not in acute distress.     Appearance: He is well-developed. He is not ill-appearing or toxic-appearing.   HENT:      Head: Normocephalic and atraumatic.   Cardiovascular:      Rate  and Rhythm: Normal rate and regular rhythm.      Heart sounds: Normal heart sounds. No murmur heard.  Pulmonary:      Effort: Pulmonary effort is normal. No respiratory distress.      Breath sounds: Normal breath sounds. No wheezing, rhonchi or rales.   Skin:     General: Skin is warm and dry.   Neurological:      Mental Status: He is alert and oriented to person, place, and time.      Cranial Nerves: No cranial nerve deficit.   Psychiatric:         Mood and Affect: Mood normal.         Behavior: Behavior normal.         Thought Content: Thought content normal.         Judgment: Judgment normal.         Class 3 Severe Obesity (BMI >=40). Obesity-related health conditions include the following: hypertension. Obesity is improving with treatment. BMI is is above average; BMI management plan is completed. We discussed portion control and increasing exercise.      Assessment & Plan   Diagnoses and all orders for this visit:    1. Elevated blood pressure reading in office with diagnosis of hypertension (Primary)  -     Comprehensive Metabolic Panel; Future    2. Class 3 severe obesity due to excess calories with serious comorbidity and body mass index (BMI) of 45.0 to 49.9 in adult    3. Rosacea    4. Chronic idiopathic gout involving toe of left foot without tophus  -     Uric Acid; Future    5. Colon cancer screening  -     Cologuard - Stool, Per Rectum; Future    6. Screening for prostate cancer  -     PSA Screen; Future    7. Screening for lipid disorders  -     Lipid Panel; Future    Other orders  -     minocycline (Minocin) 100 MG capsule; Take 1 capsule by mouth Daily.  Dispense: 90 capsule; Refill: 2  -     allopurinol (ZYLOPRIM) 100 MG tablet; Take 2 tablets by mouth Daily.  Dispense: 180 tablet; Refill: 2  -     Discontinue: doxazosin (Cardura) 1 MG tablet; Take 1 tablet by mouth Every Night.  Dispense: 90 tablet; Refill: 0  -     benazepril (LOTENSIN) 40 MG tablet; Take 1 tablet by mouth Daily.  Dispense: 90  tablet; Refill: 0  -     Discontinue: metoprolol succinate XL (TOPROL-XL) 50 MG 24 hr tablet; Take 1 tablet by mouth Every Evening.  Dispense: 90 tablet; Refill: 0  -     Discontinue: hydroCHLOROthiazide 12.5 MG tablet; Take 1 tablet by mouth Daily.  Dispense: 90 tablet; Refill: 0  -     Discontinue: dilTIAZem CD (Cardizem CD) 360 MG 24 hr capsule; Take 1 capsule by mouth Daily.  Dispense: 90 capsule; Refill: 0  -     Semaglutide,0.25 or 0.5MG/DOS, (Ozempic, 0.25 or 0.5 MG/DOSE,) 2 MG/3ML solution pen-injector; 0.25mg SQ Q7days x 1 mo then goto 0.5mg SQ Q7days  Dispense: 3 mL; Refill: 0  -     metoprolol succinate XL (TOPROL-XL) 50 MG 24 hr tablet; Take 1 tablet by mouth Every Evening.  Dispense: 90 tablet; Refill: 0  -     doxazosin (Cardura) 1 MG tablet; Take 1 tablet by mouth Every Night.  Dispense: 90 tablet; Refill: 0  -     hydroCHLOROthiazide 12.5 MG tablet; Take 1 tablet by mouth Daily.  Dispense: 90 tablet; Refill: 0  -     dilTIAZem CD (Cardizem CD) 360 MG 24 hr capsule; Take 1 capsule by mouth Daily.  Dispense: 90 capsule; Refill: 0    Trial of Ozempic pen

## 2024-02-23 RX ORDER — METOPROLOL SUCCINATE 50 MG/1
50 TABLET, EXTENDED RELEASE ORAL EVERY EVENING
Qty: 90 TABLET | Refills: 0 | Status: SHIPPED | OUTPATIENT
Start: 2024-02-23

## 2024-02-23 RX ORDER — DOXAZOSIN MESYLATE 1 MG/1
1 TABLET ORAL NIGHTLY
Qty: 90 TABLET | Refills: 0 | Status: SHIPPED | OUTPATIENT
Start: 2024-02-23

## 2024-02-23 RX ORDER — DILTIAZEM HYDROCHLORIDE 360 MG/1
360 CAPSULE, EXTENDED RELEASE ORAL DAILY
Qty: 90 CAPSULE | Refills: 0 | Status: SHIPPED | OUTPATIENT
Start: 2024-02-23

## 2024-02-23 RX ORDER — HYDROCHLOROTHIAZIDE 12.5 MG/1
12.5 TABLET ORAL DAILY
Qty: 90 TABLET | Refills: 0 | Status: SHIPPED | OUTPATIENT
Start: 2024-02-23

## 2024-03-12 RX ORDER — METOPROLOL SUCCINATE 50 MG/1
50 TABLET, EXTENDED RELEASE ORAL EVERY EVENING
Qty: 90 TABLET | Refills: 0 | Status: SHIPPED | OUTPATIENT
Start: 2024-03-12

## 2024-03-19 RX ORDER — DILTIAZEM HYDROCHLORIDE 360 MG/1
360 CAPSULE, EXTENDED RELEASE ORAL DAILY
Qty: 90 CAPSULE | Refills: 1 | Status: SHIPPED | OUTPATIENT
Start: 2024-03-19

## 2024-03-19 RX ORDER — HYDROCHLOROTHIAZIDE 12.5 MG/1
12.5 TABLET ORAL DAILY
Qty: 90 TABLET | Refills: 1 | Status: SHIPPED | OUTPATIENT
Start: 2024-03-19

## 2024-03-19 NOTE — TELEPHONE ENCOUNTER
Rx Refill Note  Requested Prescriptions     Pending Prescriptions Disp Refills    hydroCHLOROthiazide 12.5 MG tablet [Pharmacy Med Name: hydroCHLOROthiazide Oral Tablet 12.5 MG] 90 tablet 1     Sig: TAKE 1 TABLET BY MOUTH EVERY DAY    dilTIAZem CD (CARDIZEM CD) 360 MG 24 hr capsule [Pharmacy Med Name: dilTIAZem HCl ER Coated Beads Oral Capsule Extended Release 24 Hour 360 MG] 90 capsule 1     Sig: TAKE 1 CAPSULE BY MOUTH EVERY DAY      Last office visit with prescribing clinician: 2/22/2024   Last telemedicine visit with prescribing clinician: Visit date not found   Next office visit with prescribing clinician: 8/28/2024        Lipid Panel (06/13/2023 10:01)                  Would you like a call back once the refill request has been completed: [] Yes [] No    If the office needs to give you a call back, can they leave a voicemail: [] Yes [] No    Melanie Tapia, RT  03/19/24, 08:33 EDT

## 2024-04-03 ENCOUNTER — CLINICAL SUPPORT (OUTPATIENT)
Dept: FAMILY MEDICINE CLINIC | Facility: CLINIC | Age: 54
End: 2024-04-03
Payer: COMMERCIAL

## 2024-04-03 ENCOUNTER — TELEPHONE (OUTPATIENT)
Dept: FAMILY MEDICINE CLINIC | Facility: CLINIC | Age: 54
End: 2024-04-03
Payer: COMMERCIAL

## 2024-04-03 RX ORDER — DOXAZOSIN MESYLATE 1 MG/1
1 TABLET ORAL NIGHTLY
Qty: 90 TABLET | Refills: 0 | Status: SHIPPED | OUTPATIENT
Start: 2024-04-03

## 2024-04-03 NOTE — TELEPHONE ENCOUNTER
----- Message from Azul Beckett DO sent at 4/3/2024 12:01 PM EDT -----  His cuff reads too high---not useable  ----- Message -----  From: Janessa Campbell MA  Sent: 4/3/2024  10:28 AM EDT  To: Azul Beckett DO

## 2024-04-03 NOTE — PROGRESS NOTES
BP-  L arm  156/106  HR 82      Pt Cuff  Left arm  175/103  HR 81    Pt Cuff  Right arm   177/111  HR 83        Pt stated the following readings at home have been lower, readings taken around 8ish in the morning;    Date: 3/2/24  143/93    Date: 3/3/24  155/87    Date: 3/24/24  146/96

## 2024-06-18 RX ORDER — METOPROLOL SUCCINATE 50 MG/1
50 TABLET, EXTENDED RELEASE ORAL EVERY EVENING
Qty: 90 TABLET | Refills: 0 | Status: SHIPPED | OUTPATIENT
Start: 2024-06-18

## 2024-06-18 NOTE — TELEPHONE ENCOUNTER
Rx Refill Note  Requested Prescriptions     Pending Prescriptions Disp Refills    metoprolol succinate XL (TOPROL-XL) 50 MG 24 hr tablet [Pharmacy Med Name: Metoprolol Succinate ER Oral Tablet Extended Release 24 Hour 50 MG] 90 tablet 0     Sig: TAKE 1 TABLET BY MOUTH IN THE EVENING      Last office visit with prescribing clinician: 2/22/2024   Last telemedicine visit with prescribing clinician: Visit date not found   Next office visit with prescribing clinician: 8/28/2024                         Would you like a call back once the refill request has been completed: [] Yes [] No    If the office needs to give you a call back, can they leave a voicemail: [] Yes [] No    Melanie Tapia, RT  06/18/24, 09:37 EDT

## 2024-07-02 RX ORDER — DOXAZOSIN MESYLATE 1 MG/1
1 TABLET ORAL
Qty: 90 TABLET | Refills: 0 | Status: SHIPPED | OUTPATIENT
Start: 2024-07-02

## 2024-07-02 RX ORDER — BENAZEPRIL HYDROCHLORIDE 40 MG/1
40 TABLET ORAL DAILY
Qty: 90 TABLET | Refills: 0 | Status: SHIPPED | OUTPATIENT
Start: 2024-07-02

## 2024-08-28 ENCOUNTER — OFFICE VISIT (OUTPATIENT)
Dept: FAMILY MEDICINE CLINIC | Facility: CLINIC | Age: 54
End: 2024-08-28
Payer: COMMERCIAL

## 2024-08-28 VITALS
WEIGHT: 315 LBS | DIASTOLIC BLOOD PRESSURE: 65 MMHG | RESPIRATION RATE: 14 BRPM | BODY MASS INDEX: 40.43 KG/M2 | HEIGHT: 74 IN | SYSTOLIC BLOOD PRESSURE: 164 MMHG | TEMPERATURE: 98.4 F | HEART RATE: 80 BPM | OXYGEN SATURATION: 96 %

## 2024-08-28 DIAGNOSIS — Z00.00 ANNUAL PHYSICAL EXAM: Primary | ICD-10-CM

## 2024-08-28 DIAGNOSIS — M1A.0720 CHRONIC IDIOPATHIC GOUT INVOLVING TOE OF LEFT FOOT WITHOUT TOPHUS: ICD-10-CM

## 2024-08-28 DIAGNOSIS — R22.2 CHEST WALL MASS: ICD-10-CM

## 2024-08-28 DIAGNOSIS — Z12.5 SCREENING FOR PROSTATE CANCER: ICD-10-CM

## 2024-08-28 DIAGNOSIS — L91.8 SKIN TAGS, MULTIPLE ACQUIRED: ICD-10-CM

## 2024-08-28 DIAGNOSIS — I10 ELEVATED BLOOD PRESSURE READING IN OFFICE WITH DIAGNOSIS OF HYPERTENSION: ICD-10-CM

## 2024-08-28 DIAGNOSIS — Z13.220 SCREENING FOR LIPID DISORDERS: ICD-10-CM

## 2024-08-28 LAB
BILIRUB BLD-MCNC: NEGATIVE MG/DL
CLARITY, POC: CLEAR
COLOR UR: YELLOW
EXPIRATION DATE: NORMAL
GLUCOSE UR STRIP-MCNC: NEGATIVE MG/DL
KETONES UR QL: NEGATIVE
LEUKOCYTE EST, POC: NEGATIVE
Lab: NORMAL
NITRITE UR-MCNC: NEGATIVE MG/ML
PH UR: 7.5 [PH] (ref 5–8)
PROT UR STRIP-MCNC: NEGATIVE MG/DL
RBC # UR STRIP: NEGATIVE /UL
SP GR UR: 1.02 (ref 1–1.03)
UROBILINOGEN UR QL: NORMAL

## 2024-08-28 PROCEDURE — 84550 ASSAY OF BLOOD/URIC ACID: CPT | Performed by: FAMILY MEDICINE

## 2024-08-28 PROCEDURE — 81003 URINALYSIS AUTO W/O SCOPE: CPT | Performed by: FAMILY MEDICINE

## 2024-08-28 PROCEDURE — 80061 LIPID PANEL: CPT | Performed by: FAMILY MEDICINE

## 2024-08-28 PROCEDURE — G0103 PSA SCREENING: HCPCS | Performed by: FAMILY MEDICINE

## 2024-08-28 PROCEDURE — 80053 COMPREHEN METABOLIC PANEL: CPT | Performed by: FAMILY MEDICINE

## 2024-08-28 PROCEDURE — 36415 COLL VENOUS BLD VENIPUNCTURE: CPT | Performed by: FAMILY MEDICINE

## 2024-08-28 PROCEDURE — 99396 PREV VISIT EST AGE 40-64: CPT | Performed by: FAMILY MEDICINE

## 2024-08-28 RX ORDER — DOXAZOSIN 2 MG/1
2 TABLET ORAL
Status: SHIPPED
Start: 2024-08-28 | End: 2024-08-28

## 2024-08-28 RX ORDER — BENAZEPRIL HYDROCHLORIDE 40 MG/1
40 TABLET ORAL DAILY
Qty: 90 TABLET | Refills: 1 | Status: SHIPPED | OUTPATIENT
Start: 2024-08-28

## 2024-08-28 RX ORDER — DOXAZOSIN 2 MG/1
2 TABLET ORAL
Qty: 90 TABLET | Refills: 1 | Status: SHIPPED | OUTPATIENT
Start: 2024-08-28

## 2024-08-28 RX ORDER — METOPROLOL SUCCINATE 50 MG/1
50 TABLET, EXTENDED RELEASE ORAL EVERY EVENING
Qty: 90 TABLET | Refills: 1 | Status: SHIPPED | OUTPATIENT
Start: 2024-08-28

## 2024-08-28 RX ORDER — HYDROCHLOROTHIAZIDE 12.5 MG/1
12.5 TABLET ORAL DAILY
Qty: 90 TABLET | Refills: 1 | Status: SHIPPED | OUTPATIENT
Start: 2024-08-28

## 2024-08-28 RX ORDER — MINOCYCLINE HYDROCHLORIDE 100 MG/1
100 CAPSULE ORAL DAILY
Qty: 90 CAPSULE | Refills: 2 | Status: SHIPPED | OUTPATIENT
Start: 2024-08-28

## 2024-08-28 RX ORDER — DILTIAZEM HYDROCHLORIDE 360 MG/1
360 CAPSULE, EXTENDED RELEASE ORAL DAILY
Qty: 90 CAPSULE | Refills: 1 | Status: SHIPPED | OUTPATIENT
Start: 2024-08-28

## 2024-08-28 NOTE — PROGRESS NOTES
Venipuncture performed in L ARM by RT Percy  with good hemostasis. Patient tolerated well. 08/28/24 Melanie Tapia, RT

## 2024-08-28 NOTE — PROGRESS NOTES
Subjective   Boyd Reese Jr. is a 54 y.o. male.     Chief Complaint   Patient presents with    Annual Exam     Physical         Current Outpatient Medications:     allopurinol (ZYLOPRIM) 100 MG tablet, Take 2 tablets by mouth Daily., Disp: 180 tablet, Rfl: 2    benazepril (LOTENSIN) 40 MG tablet, Take 1 tablet by mouth Daily., Disp: 90 tablet, Rfl: 1    colchicine 0.6 MG tablet, TAKE 2 TABLETS BY MOUTH AS NEEDED FOR GOUT ATTACK, THEN 1 TABLET AGAIN IN 1 HOUR. DO NOT REPEAT FOR 3 DAYS, Disp: 20 tablet, Rfl: 0    dilTIAZem CD (CARDIZEM CD) 360 MG 24 hr capsule, Take 1 capsule by mouth Daily., Disp: 90 capsule, Rfl: 1    doxazosin (CARDURA) 2 MG tablet, Take 1 tablet by mouth every night at bedtime., Disp: 90 tablet, Rfl: 1    hydroCHLOROthiazide 12.5 MG tablet, Take 1 tablet by mouth Daily., Disp: 90 tablet, Rfl: 1    metoprolol succinate XL (TOPROL-XL) 50 MG 24 hr tablet, Take 1 tablet by mouth Every Evening., Disp: 90 tablet, Rfl: 1    minocycline (Minocin) 100 MG capsule, Take 1 capsule by mouth Daily., Disp: 90 capsule, Rfl: 2    Past Medical History:   Diagnosis Date    Gout 10/24/2018    Hypertension 10/24/2018    Obesity 10/24/2018    PSA     2021= 1.29/ 2022 =1.67/ 2023= 1.34       Past Surgical History:   Procedure Laterality Date    COLONOSCOPY      COLOGUARD ------NEG = 2021/ 2024 rech 2027       Family History   Problem Relation Age of Onset    No Known Problems Mother     Hypertension Father     Seizures Sister        Social History     Socioeconomic History    Marital status:    Tobacco Use    Smoking status: Never     Passive exposure: Past    Smokeless tobacco: Never   Vaping Use    Vaping status: Never Used   Substance and Sexual Activity    Alcohol use: Yes     Alcohol/week: 7.0 standard drinks of alcohol     Types: 7 Cans of beer per week    Drug use: No    Sexual activity: Yes     Partners: Female     Birth control/protection: Post-menopausal       History of Present Illness  The patient is  a 54-year-old  male here for an annual physical exam.    He has been monitoring his blood pressure at home, which was typically in the 140s last month. He reports no side effects from his current medications.    His gout condition has significantly improved.    He has completed a Cologuard test.    He experiences occasional dizziness and gum pain, but it is unclear if these symptoms are related to his medication.    He has not taken Ozempic for several months.    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 His diet includes breakfast and lunch, and dinner meals prepared by his wife.    He reports no heartburn, shortness of breath, wheezing, gas, bloating, diarrhea, constipation, or any changes in stool color or consistency.    He has noticed some skin tags on his chest, which have not increased in size.    He wakes up once at night to urinate but reports no excessive dribbling or urgency.    ALLERGIES  He is allergic to AMLODIPINE because it makes him swell.        The following portions of the patient's history were reviewed and updated as appropriate: allergies, current medications, past family history, past medical history, past social history, past surgical history and problem list.    Review of Systems   Constitutional:  Negative for activity change, fatigue and unexpected weight gain.   Respiratory:  Negative for cough, chest tightness, shortness of breath and wheezing.    Cardiovascular:  Negative for chest pain, palpitations and leg swelling.   Gastrointestinal:  Negative for constipation, diarrhea and GERD.   Genitourinary:  Negative for frequency, urgency and urinary incontinence.   Musculoskeletal:  Negative for arthralgias and myalgias.   Skin:  Positive for rash.   Neurological:  Negative for dizziness, facial asymmetry, speech difficulty, weakness, light-headedness, headache, memory problem and confusion.       Vitals:    08/28/24 0907   BP: 164/65    Pulse: 80   Resp: 14   Temp: 98.4 °F (36.9 °C)   SpO2: 96%       Objective   Physical Exam  Vitals and nursing note reviewed.   Constitutional:       Appearance: He is well-developed. He is not ill-appearing or toxic-appearing.   HENT:      Head: Normocephalic and atraumatic.   Cardiovascular:      Rate and Rhythm: Normal rate and regular rhythm.      Heart sounds: Normal heart sounds. No murmur heard.  Pulmonary:      Effort: Pulmonary effort is normal.      Breath sounds: Normal breath sounds.   Chest:          Comments: 3 cm subcut mobile mass @ central ant chest w/o erythema or TTP  Abdominal:      Hernia: There is no hernia in the left inguinal area or right inguinal area.   Genitourinary:     Penis: Normal and circumcised.       Testes: Normal.         Right: Mass not present.         Left: Mass not present.      Epididymis:      Right: Normal.      Left: Normal.      Prostate: Enlarged. No nodules present.      Rectum: Normal. No mass or tenderness.   Musculoskeletal:      Cervical back: Normal range of motion and neck supple.   Skin:     General: Skin is warm and dry.      Findings: No rash.      Comments: +many skin tags @ Rt axilla and lg one @ Left prox inner thigh   Neurological:      Mental Status: He is alert and oriented to person, place, and time.   Psychiatric:         Behavior: Behavior normal.         Thought Content: Thought content normal.         Judgment: Judgment normal.       Physical Exam  Vital Signs  IJ=740/65.     Assessment & Plan   Diagnoses and all orders for this visit:    1. Annual physical exam (Primary)  -     POC Urinalysis Dipstick, Automated    2. Elevated blood pressure reading in office with diagnosis of hypertension  -     Comprehensive Metabolic Panel    3. Chronic idiopathic gout involving toe of left foot without tophus  -     Uric Acid    4. Chest wall mass  -     Ambulatory Referral to Dermatology    5. Skin tags, multiple acquired  -     Ambulatory Referral to  Dermatology    6. Screening for lipid disorders  -     Lipid Panel    7. Screening for prostate cancer  -     PSA Screen    Other orders  -     Discontinue: doxazosin (CARDURA) 2 MG tablet; Take 1 tablet by mouth every night at bedtime.  -     benazepril (LOTENSIN) 40 MG tablet; Take 1 tablet by mouth Daily.  Dispense: 90 tablet; Refill: 1  -     dilTIAZem CD (CARDIZEM CD) 360 MG 24 hr capsule; Take 1 capsule by mouth Daily.  Dispense: 90 capsule; Refill: 1  -     hydroCHLOROthiazide 12.5 MG tablet; Take 1 tablet by mouth Daily.  Dispense: 90 tablet; Refill: 1  -     metoprolol succinate XL (TOPROL-XL) 50 MG 24 hr tablet; Take 1 tablet by mouth Every Evening.  Dispense: 90 tablet; Refill: 1  -     minocycline (Minocin) 100 MG capsule; Take 1 capsule by mouth Daily.  Dispense: 90 capsule; Refill: 2  -     doxazosin (CARDURA) 2 MG tablet; Take 1 tablet by mouth every night at bedtime.  Dispense: 90 tablet; Refill: 1      Assessment & Plan  1. Hypertension.  His blood pressure readings at home have been in the 140s, which is not at the target level. Blood pressure today is 164/65. The dosage of Cardura will be increased from 1 mg to 2 mg to better manage his blood pressure. He will double up on his current 1 mg tablets until the new prescription is filled. He is advised to continue his current medications: benazepril 40 mg, Cardizem  mg, hydrochlorothiazide 12.5 mg, and metoprolol 50 mg. Labs will be ordered to check kidney function and potassium levels to ensure no adverse effects from the medications.    2. Hyperlipidemia.  A lipid panel will be ordered to assess his cholesterol levels. He is advised to continue his current diet and consider incorporating more physical activity, such as regular walking, into his routine.    3. Gout.  His gout is well-controlled with his current regimen. He will continue taking allopurinol 200 mg daily and colchicine as needed. No changes to his medication are necessary at this  time.    4. Rosacea.  He reports that his rosacea is well-managed with minocycline 1 tablet daily. He will continue this regimen and will readdress in 6 months.    5. Obesity.  His weight remains stable at 354 pounds, consistent with the previous year's measurement of 355 pounds. He is advised to incorporate regular walking into his routine for both physical and mental health benefits. The potential use of Topamax for long-term weight loss was discussed, but no prescription was provided at this time.    6. Skin Tags.  A referral to dermatology will be made for further evaluation and potential removal of skin tags. He reports that they are bothersome and would like them removed.    7. Prostate Health.  A PSA test will be ordered to monitor prostate health. He reports no significant urinary symptoms such as split stream, urgency, or excessive dribbling.       Results            Patient or patient representative verbalized consent for the use of Ambient Listening during the visit with  Azul Beckett DO for chart documentation. 8/28/2024  22:15 EDT

## 2024-08-29 LAB
ALBUMIN SERPL-MCNC: 4 G/DL (ref 3.5–5.2)
ALBUMIN/GLOB SERPL: 1.3 G/DL
ALP SERPL-CCNC: 92 U/L (ref 39–117)
ALT SERPL W P-5'-P-CCNC: 27 U/L (ref 1–41)
ANION GAP SERPL CALCULATED.3IONS-SCNC: 13.7 MMOL/L (ref 5–15)
AST SERPL-CCNC: 34 U/L (ref 1–40)
BILIRUB SERPL-MCNC: 0.7 MG/DL (ref 0–1.2)
BUN SERPL-MCNC: 14 MG/DL (ref 6–20)
BUN/CREAT SERPL: 11.4 (ref 7–25)
CALCIUM SPEC-SCNC: 9.6 MG/DL (ref 8.6–10.5)
CHLORIDE SERPL-SCNC: 101 MMOL/L (ref 98–107)
CHOLEST SERPL-MCNC: 168 MG/DL (ref 0–200)
CO2 SERPL-SCNC: 25.3 MMOL/L (ref 22–29)
CREAT SERPL-MCNC: 1.23 MG/DL (ref 0.76–1.27)
EGFRCR SERPLBLD CKD-EPI 2021: 69.8 ML/MIN/1.73
GLOBULIN UR ELPH-MCNC: 3.1 GM/DL
GLUCOSE SERPL-MCNC: 102 MG/DL (ref 65–99)
HDLC SERPL-MCNC: 40 MG/DL (ref 40–60)
LDLC SERPL CALC-MCNC: 105 MG/DL (ref 0–100)
LDLC/HDLC SERPL: 2.57 {RATIO}
POTASSIUM SERPL-SCNC: 4.4 MMOL/L (ref 3.5–5.2)
PROT SERPL-MCNC: 7.1 G/DL (ref 6–8.5)
PSA SERPL-MCNC: 1.45 NG/ML (ref 0–4)
SODIUM SERPL-SCNC: 140 MMOL/L (ref 136–145)
TRIGL SERPL-MCNC: 127 MG/DL (ref 0–150)
URATE SERPL-MCNC: 5.4 MG/DL (ref 3.4–7)
VLDLC SERPL-MCNC: 23 MG/DL (ref 5–40)

## 2024-08-29 RX ORDER — ALLOPURINOL 100 MG/1
200 TABLET ORAL DAILY
Qty: 180 TABLET | Refills: 2 | Status: SHIPPED | OUTPATIENT
Start: 2024-08-29

## 2024-09-17 RX ORDER — DILTIAZEM HYDROCHLORIDE 360 MG/1
360 CAPSULE, EXTENDED RELEASE ORAL DAILY
Qty: 90 CAPSULE | Refills: 1 | OUTPATIENT
Start: 2024-09-17

## 2025-02-25 RX ORDER — DOXAZOSIN 2 MG/1
2 TABLET ORAL
Qty: 90 TABLET | Refills: 0 | OUTPATIENT
Start: 2025-02-25

## 2025-02-25 RX ORDER — BENAZEPRIL HYDROCHLORIDE 40 MG/1
40 TABLET ORAL DAILY
Qty: 90 TABLET | Refills: 0 | OUTPATIENT
Start: 2025-02-25

## 2025-02-25 RX ORDER — DILTIAZEM HYDROCHLORIDE 360 MG/1
360 CAPSULE, EXTENDED RELEASE ORAL DAILY
Qty: 90 CAPSULE | Refills: 0 | OUTPATIENT
Start: 2025-02-25

## 2025-02-25 RX ORDER — HYDROCHLOROTHIAZIDE 12.5 MG/1
12.5 TABLET ORAL DAILY
Qty: 90 TABLET | Refills: 0 | OUTPATIENT
Start: 2025-02-25

## 2025-02-25 RX ORDER — METOPROLOL SUCCINATE 50 MG/1
50 TABLET, EXTENDED RELEASE ORAL EVERY EVENING
Qty: 90 TABLET | Refills: 0 | OUTPATIENT
Start: 2025-02-25

## 2025-02-25 NOTE — TELEPHONE ENCOUNTER
Rx Refill Note  Requested Prescriptions     Pending Prescriptions Disp Refills    dilTIAZem CD (CARDIZEM CD) 360 MG 24 hr capsule [Pharmacy Med Name: dilTIAZem HCl ER Coated Beads Oral Capsule Extended Release 24 Hour 360 MG] 90 capsule 0     Sig: TAKE 1 CAPSULE BY MOUTH EVERY DAY    metoprolol succinate XL (TOPROL-XL) 50 MG 24 hr tablet [Pharmacy Med Name: Metoprolol Succinate ER Oral Tablet Extended Release 24 Hour 50 MG] 90 tablet 0     Sig: TAKE 1 TABLET BY MOUTH IN THE EVENING    benazepril (LOTENSIN) 40 MG tablet [Pharmacy Med Name: Benazepril HCl Oral Tablet 40 MG] 90 tablet 0     Sig: TAKE 1 TABLET BY MOUTH EVERY DAY    doxazosin (CARDURA) 2 MG tablet [Pharmacy Med Name: Doxazosin Mesylate Oral Tablet 2 MG] 90 tablet 0     Sig: TAKE 1 TABLET BY MOUTH AT BEDTIME    hydroCHLOROthiazide 12.5 MG tablet [Pharmacy Med Name: hydroCHLOROthiazide Oral Tablet 12.5 MG] 90 tablet 0     Sig: TAKE 1 TABLET BY MOUTH EVERY DAY      Last office visit with prescribing clinician: 8/28/2024   Last telemedicine visit with prescribing clinician: Visit date not found   Next office visit with prescribing clinician: Visit date not found        Lipid Panel (08/28/2024 09:48)                  Would you like a call back once the refill request has been completed: [] Yes [] No    If the office needs to give you a call back, can they leave a voicemail: [] Yes [] No    Melanie Tapia, RT  02/25/25, 10:17 EST

## 2025-02-26 NOTE — TELEPHONE ENCOUNTER
Patient scheduled but is out of some of these medications, requesting refills to get through until at least that appt.

## 2025-03-03 RX ORDER — BENAZEPRIL HYDROCHLORIDE 40 MG/1
40 TABLET ORAL DAILY
Qty: 90 TABLET | Refills: 0 | Status: SHIPPED | OUTPATIENT
Start: 2025-03-03 | End: 2025-03-06

## 2025-03-03 RX ORDER — DOXAZOSIN 2 MG/1
2 TABLET ORAL
Qty: 90 TABLET | Refills: 0 | Status: SHIPPED | OUTPATIENT
Start: 2025-03-03

## 2025-03-03 RX ORDER — DILTIAZEM HYDROCHLORIDE 360 MG/1
360 CAPSULE, EXTENDED RELEASE ORAL DAILY
Qty: 90 CAPSULE | Refills: 0 | Status: SHIPPED | OUTPATIENT
Start: 2025-03-03 | End: 2025-03-06 | Stop reason: SDUPTHER

## 2025-03-03 RX ORDER — HYDROCHLOROTHIAZIDE 12.5 MG/1
12.5 TABLET ORAL DAILY
Qty: 90 TABLET | Refills: 0 | Status: SHIPPED | OUTPATIENT
Start: 2025-03-03 | End: 2025-03-06

## 2025-03-03 RX ORDER — METOPROLOL SUCCINATE 50 MG/1
50 TABLET, EXTENDED RELEASE ORAL EVERY EVENING
Qty: 90 TABLET | Refills: 0 | Status: SHIPPED | OUTPATIENT
Start: 2025-03-03 | End: 2025-03-06 | Stop reason: SDUPTHER

## 2025-03-06 ENCOUNTER — OFFICE VISIT (OUTPATIENT)
Dept: FAMILY MEDICINE CLINIC | Facility: CLINIC | Age: 55
End: 2025-03-06
Payer: COMMERCIAL

## 2025-03-06 VITALS
HEIGHT: 74 IN | HEART RATE: 74 BPM | WEIGHT: 315 LBS | BODY MASS INDEX: 40.43 KG/M2 | OXYGEN SATURATION: 96 % | SYSTOLIC BLOOD PRESSURE: 140 MMHG | DIASTOLIC BLOOD PRESSURE: 78 MMHG | TEMPERATURE: 98 F | RESPIRATION RATE: 18 BRPM

## 2025-03-06 DIAGNOSIS — Z13.220 SCREENING FOR LIPID DISORDERS: ICD-10-CM

## 2025-03-06 DIAGNOSIS — Z12.5 SCREENING FOR PROSTATE CANCER: ICD-10-CM

## 2025-03-06 DIAGNOSIS — M10.072 IDIOPATHIC GOUT INVOLVING TOE OF LEFT FOOT, UNSPECIFIED CHRONICITY: ICD-10-CM

## 2025-03-06 DIAGNOSIS — I10 PRIMARY HYPERTENSION: Primary | ICD-10-CM

## 2025-03-06 PROCEDURE — 99213 OFFICE O/P EST LOW 20 MIN: CPT | Performed by: FAMILY MEDICINE

## 2025-03-06 RX ORDER — BENAZEPRIL HYDROCHLORIDE AND HYDROCHLOROTHIAZIDE 20; 12.5 MG/1; MG/1
2 TABLET ORAL DAILY
Qty: 180 TABLET | Refills: 1 | Status: SHIPPED | OUTPATIENT
Start: 2025-03-06

## 2025-03-06 RX ORDER — HYDROCHLOROTHIAZIDE 12.5 MG/1
25 TABLET ORAL DAILY
Status: SHIPPED
Start: 2025-03-06 | End: 2025-03-06

## 2025-03-06 RX ORDER — METOPROLOL SUCCINATE 50 MG/1
50 TABLET, EXTENDED RELEASE ORAL EVERY EVENING
Qty: 90 TABLET | Refills: 1 | Status: SHIPPED | OUTPATIENT
Start: 2025-03-06

## 2025-03-06 RX ORDER — DILTIAZEM HYDROCHLORIDE 360 MG/1
360 CAPSULE, EXTENDED RELEASE ORAL DAILY
Qty: 90 CAPSULE | Refills: 1 | Status: SHIPPED | OUTPATIENT
Start: 2025-03-06

## 2025-03-06 NOTE — PROGRESS NOTES
Answers submitted by the patient for this visit:  High Blood Pressure Questionnaire (Submitted on 3/6/2025)  Chief Complaint: Hypertension  Chronicity: chronic  Onset: more than 1 year ago  Progression since onset: unchanged  anxiety: No  blurred vision: No  chest pain: No  headaches: No  malaise/fatigue: No  orthopnea: No  palpitations: No  peripheral edema: No  shortness of breath: No  Agents associated with hypertension: no associated agents  Compliance problems: no compliance problems  Subjective   Boyd Reese Jr. is a 54 y.o. male.     Chief Complaint   Patient presents with    Hypertension    Gout         Current Outpatient Medications:     allopurinol (ZYLOPRIM) 100 MG tablet, Take 2 tablets by mouth Daily., Disp: 180 tablet, Rfl: 2    colchicine 0.6 MG tablet, TAKE 2 TABLETS BY MOUTH AS NEEDED FOR GOUT ATTACK, THEN 1 TABLET AGAIN IN 1 HOUR. DO NOT REPEAT FOR 3 DAYS, Disp: 20 tablet, Rfl: 0    dilTIAZem CD (CARDIZEM CD) 360 MG 24 hr capsule, Take 1 capsule by mouth Daily., Disp: 90 capsule, Rfl: 1    doxazosin (CARDURA) 2 MG tablet, Take 1 tablet by mouth every night at bedtime., Disp: 90 tablet, Rfl: 0    metoprolol succinate XL (TOPROL-XL) 50 MG 24 hr tablet, Take 1 tablet by mouth Every Evening., Disp: 90 tablet, Rfl: 1    minocycline (Minocin) 100 MG capsule, Take 1 capsule by mouth Daily., Disp: 90 capsule, Rfl: 2    benazepril-hydrochlorthiazide (Lotensin HCT) 20-12.5 MG per tablet, Take 2 tablets by mouth Daily., Disp: 180 tablet, Rfl: 1    Past Medical History:   Diagnosis Date    Gout 10/24/2018    Hypertension 10/24/2018    Obesity 10/24/2018    PSA     2021= 1.29/ 2022 =1.67/ 2023= 1.34/ 2024= 1.45       Past Surgical History:   Procedure Laterality Date    COLONOSCOPY      COLOGUARD ------NEG = 2021/ 2024 rech 2027       Family History   Problem Relation Age of Onset    No Known Problems Mother     Hypertension Father     Seizures Sister        Social History     Socioeconomic History    Marital  status:    Tobacco Use    Smoking status: Never     Passive exposure: Past    Smokeless tobacco: Never   Vaping Use    Vaping status: Never Used   Substance and Sexual Activity    Alcohol use: Yes     Alcohol/week: 7.0 standard drinks of alcohol     Types: 7 Cans of beer per week    Drug use: No    Sexual activity: Yes     Partners: Female     Birth control/protection: Post-menopausal       History of Present Illness  The patient presents for evaluation of gout, hypertension, and rosacea.    He has been free from gout flare-ups for an extended period, even while on a diuretic regimen. He recalls a past episode of dehydration that exacerbated his gout symptoms. His last physical examination was conducted in 08/2024. He has completed two Cologuard tests, with the most recent one performed in 2027.     He has not received the COVID-19 vaccine and does not plan to do so. He also declines the influenza vaccine. He is considering the shingles vaccine as a preventive measure against chickenpox recurrence.     His home blood pressure readings have consistently been within the target range, averaging around 140/78. He is uncertain about his pulse rate at home. He reports no urinary complications. He monitors his blood pressure at home. He takes benazepril 40 mg, Cardizem 360 mg, doxazosin 2 mg at night, a baby water pill 12.5 mg, and metoprolol 50 mg for blood pressure management. He administers all his antihypertensive medications concurrently in the morning, a practice that does not cause him any discomfort. He takes Cardura strictly for blood pressure in the morning around 8:30 or 9:00 AM.     He has been taking minocycline once a day for rosacea, which has helped stabilize his condition. He works indoors and takes it consistently.    SOCIAL HISTORY  He does not smoke. He consumes alcohol on weekends, mainly while watching ballgames. He does not use drugs. He works indoors.    FAMILY HISTORY  His father has blood  pressure issues. His sister has seizures.    ALLERGIES  AMLODIPINE    MEDICATIONS  Current: Allopurinol, colchicine, benazepril, Cardizem, doxazosin, metoprolol, minocycline.    IMMUNIZATIONS  He has not received the COVID-19 vaccine and does not plan to do so. He also declines the influenza vaccine.        The following portions of the patient's history were reviewed and updated as appropriate: allergies, current medications, past family history, past medical history, past social history, past surgical history and problem list.    Review of Systems   Constitutional:  Negative for activity change, fatigue and unexpected weight gain.   Eyes:  Negative for blurred vision.   Respiratory:  Negative for cough, chest tightness and shortness of breath.    Cardiovascular:  Negative for chest pain, palpitations and leg swelling.   Genitourinary:  Negative for difficulty urinating, frequency, hematuria, nocturia, urgency and urinary incontinence.   Musculoskeletal:  Negative for arthralgias and myalgias.   Neurological:  Negative for dizziness, facial asymmetry, speech difficulty, weakness, light-headedness, headache, memory problem and confusion.       Vitals:    03/06/25 1335   BP: 140/78   Pulse:    Resp:    Temp:    SpO2:        Objective   Physical Exam  Vitals and nursing note reviewed.   Constitutional:       General: He is not in acute distress.     Appearance: He is well-developed. He is obese. He is not ill-appearing or toxic-appearing.   HENT:      Head: Normocephalic and atraumatic.   Neck:      Vascular: No carotid bruit.   Cardiovascular:      Rate and Rhythm: Normal rate and regular rhythm.      Heart sounds: Normal heart sounds. No murmur heard.  Pulmonary:      Effort: Pulmonary effort is normal. No respiratory distress.      Breath sounds: Normal breath sounds. No wheezing, rhonchi or rales.   Musculoskeletal:      Cervical back: Neck supple.   Skin:     General: Skin is warm and dry.      Findings: No rash.    Neurological:      Mental Status: He is alert and oriented to person, place, and time.      Cranial Nerves: No cranial nerve deficit.   Psychiatric:         Mood and Affect: Mood normal.         Behavior: Behavior normal.         Thought Content: Thought content normal.         Judgment: Judgment normal.       Physical Exam  Vital Signs  Blood pressure is 140/78. Pulse is 54. Height is 6 feet 2 inches.     Class 3 Severe Obesity (BMI >=40). Obesity-related health conditions include the following: hypertension. Obesity is unchanged. BMI is is above average; BMI management plan is completed. We discussed portion control and increasing exercise.      Assessment & Plan   Diagnoses and all orders for this visit:    1. Primary hypertension (Primary)  -     Comprehensive Metabolic Panel; Future    2. Idiopathic gout involving toe of left foot, unspecified chronicity  -     Uric Acid; Future    3. Screening for prostate cancer  -     PSA Screen; Future    4. Screening for lipid disorders  -     Lipid Panel; Future    Other orders  -     Discontinue: hydroCHLOROthiazide 12.5 MG tablet; Take 2 tablets by mouth Daily.  -     metoprolol succinate XL (TOPROL-XL) 50 MG 24 hr tablet; Take 1 tablet by mouth Every Evening.  Dispense: 90 tablet; Refill: 1  -     dilTIAZem CD (CARDIZEM CD) 360 MG 24 hr capsule; Take 1 capsule by mouth Daily.  Dispense: 90 capsule; Refill: 1  -     benazepril-hydrochlorthiazide (Lotensin HCT) 20-12.5 MG per tablet; Take 2 tablets by mouth Daily.  Dispense: 180 tablet; Refill: 1      Assessment & Plan  1. Gout.  He has not experienced a gout flare-up for a long time. He will continue taking allopurinol 200 mg twice daily. Colchicine is available for emergency use but is rarely needed. Uric acid levels will be checked during the upcoming lab work in early September 2025.    2. Hypertension.  His blood pressure readings are around 140/78 mmHg. He will continue his current medications: benazepril 40 mg,  Cardizem 360 mg, doxazosin 2 mg at night, and metoprolol 50 mg. The hydrochlorothiazide dosage will be increased to 25 mg by taking two 12.5 mg tablets in the morning. If he experiences any adverse effects from the increased hydrochlorothiazide dosage, adjustments will be made. Blood pressure will be monitored at home.    3. Rosacea.  He will continue taking minocycline once daily. He is advised to use sunscreen or wear a hat during the summer months to prevent sunburn due to the antibiotic.    4. Health maintenance.  He has completed his Cologuard test, which is valid until 2027. He is advised to consider the shingles vaccine for chickenpox recurrence prevention and the pneumococcal vaccine for pneumonia prevention. Fasting labs, including lipid panel, PSA, CMP, and uric acid, will be conducted in early September 2025.     Results            Patient or patient representative verbalized consent for the use of Ambient Listening during the visit with  Azul Beckett DO for chart documentation. 3/23/2025  13:50 EST

## 2025-06-03 RX ORDER — DOXAZOSIN 2 MG/1
2 TABLET ORAL
Qty: 90 TABLET | Refills: 0 | Status: SHIPPED | OUTPATIENT
Start: 2025-06-03